# Patient Record
Sex: FEMALE | Race: BLACK OR AFRICAN AMERICAN | Employment: OTHER | URBAN - METROPOLITAN AREA
[De-identification: names, ages, dates, MRNs, and addresses within clinical notes are randomized per-mention and may not be internally consistent; named-entity substitution may affect disease eponyms.]

---

## 2017-04-28 ENCOUNTER — OFFICE VISIT (OUTPATIENT)
Dept: INTERNAL MEDICINE CLINIC | Facility: CLINIC | Age: 67
End: 2017-04-28

## 2017-04-28 VITALS
RESPIRATION RATE: 17 BRPM | TEMPERATURE: 98 F | HEART RATE: 86 BPM | OXYGEN SATURATION: 97 % | DIASTOLIC BLOOD PRESSURE: 62 MMHG | SYSTOLIC BLOOD PRESSURE: 130 MMHG

## 2017-04-28 DIAGNOSIS — G91.2 NPH (NORMAL PRESSURE HYDROCEPHALUS) (HCC): ICD-10-CM

## 2017-04-28 DIAGNOSIS — I48.20 CHRONIC ATRIAL FIBRILLATION (HCC): ICD-10-CM

## 2017-04-28 DIAGNOSIS — Z98.890 S/P AORTIC VALVE REPAIR: ICD-10-CM

## 2017-04-28 DIAGNOSIS — I10 HTN, GOAL BELOW 140/80: ICD-10-CM

## 2017-04-28 DIAGNOSIS — E78.5 HYPERLIPIDEMIA LDL GOAL <100: ICD-10-CM

## 2017-04-28 DIAGNOSIS — Z12.31 VISIT FOR SCREENING MAMMOGRAM: ICD-10-CM

## 2017-04-28 DIAGNOSIS — R53.83 FATIGUE, UNSPECIFIED TYPE: ICD-10-CM

## 2017-04-28 DIAGNOSIS — Z51.81 THERAPEUTIC DRUG MONITORING: ICD-10-CM

## 2017-04-28 DIAGNOSIS — R26.89 NEED FOR ASSISTANCE DUE TO UNSTEADY GAIT: ICD-10-CM

## 2017-04-28 PROCEDURE — 99204 OFFICE O/P NEW MOD 45 MIN: CPT | Performed by: INTERNAL MEDICINE

## 2017-04-28 RX ORDER — SIMVASTATIN 10 MG
20 TABLET ORAL NIGHTLY
COMMUNITY
End: 2018-06-18

## 2017-04-28 RX ORDER — AMLODIPINE BESYLATE 10 MG/1
10 TABLET ORAL DAILY
COMMUNITY
End: 2018-06-18

## 2017-04-28 RX ORDER — FERROUS SULFATE 300 MG/5ML
300 LIQUID (ML) ORAL DAILY
COMMUNITY
End: 2017-06-06

## 2017-04-28 RX ORDER — WARFARIN SODIUM 10 MG/1
TABLET ORAL
COMMUNITY
End: 2017-10-18

## 2017-04-28 RX ORDER — ENALAPRIL MALEATE 10 MG/1
TABLET ORAL
COMMUNITY
End: 2017-04-28 | Stop reason: ALTCHOICE

## 2017-04-28 RX ORDER — HYDROCODONE BITARTRATE AND ACETAMINOPHEN 10; 300 MG/1; MG/1
TABLET ORAL
COMMUNITY
End: 2017-04-28 | Stop reason: ALTCHOICE

## 2017-04-28 RX ORDER — LISINOPRIL 2.5 MG/1
2.5 TABLET ORAL DAILY
COMMUNITY
End: 2018-06-18

## 2017-04-28 NOTE — PROGRESS NOTES
HPI:    Patient ID: Sotero Medeiros is a 74year old female. HPI     Patient is wheelchair bound here with daughter  . Both are poor historians. Patient has h/o aortic valve replacement, chronic Afib, HTN, Hyperlipidemia and anemia.   Her mother was Missing teeth. , poor dentition. Eyes: EOM are normal. Pupils are equal, round, and reactive to light. No scleral icterus. Slightly pale conjunctiva. Neck: Normal range of motion. Neck supple. No tracheal deviation present.  No thyromegaly present encounter       Imaging & Referrals:  PHYSICAL THERAPY - INTERNAL  PEPITO SCREENING BILAT (CPT=77067)       #[id number]

## 2017-04-28 NOTE — PROGRESS NOTES
HPI:   Roz Carranza is a 74year old female who presents for a {Medicare Annual Wellness Description:3401}.     ***  Her last annual assessment has been over 1 year: Annual Physical due on 07/08/1952         Patient Care Team: Patient Care Team:  STEPHANIE denies chest pain on exertion  GI: denies abdominal pain, denies heartburn  : denies dysuria, vaginal discharge or itching, no complaint of urinary incontinence   MUSCULOSKELETAL: denies back pain  NEURO: denies headaches  PSYCHE: denies depression or an General Health                                              Functional Ability                                                  Hearing Problems?: No     Functional Status     Hearing Problems?: No    Vision Problems? : No    Difficulty walking?: Yes flowsheet data found. Bone Density Screening      Dexascan Every two years No results found for this or any previous visit. No flowsheet data found.     Pap and Pelvic      Pap: Every 3 yrs age 21-65 or Pap+HPV every 5 yrs age 33-67, age 72 and older at results found for: CREATSERUM No flowsheet data found. BUN  Annually No results found for: BUN No flowsheet data found. Drug Serum Conc  Annually No results found for: DIGOXIN, DIG, VALP No flowsheet data found.     Diabetes      HgbA1C  Annually No

## 2017-04-28 NOTE — PATIENT INSTRUCTIONS
Rashawn Arias SCREENING SCHEDULE   Tests on this list are recommended by your physician but may not be covered, or covered at this frequency, by your insurer. Please check with your insurance carrier before scheduling to verify coverage.    PREVEN results found for this or any previous visit. No flowsheet data found. Fecal Occult Blood   Covered Annually No results found for: FOB, OCCULTSTOOL No flowsheet data found.      Barium Enema-   uncomfortable but covered  Covered but uncomfortable   Glau this or any previous visit.  Medium/high risk factors:   End-stage renal disease   Hemophiliacs who received Factor VIII or IX concentrates   Clients of institutions for the mentally retarded   Persons who live in the same house as a HepB virus carrier   [facility]

## 2017-05-16 ENCOUNTER — TELEPHONE (OUTPATIENT)
Dept: INTERNAL MEDICINE CLINIC | Facility: CLINIC | Age: 67
End: 2017-05-16

## 2017-05-18 ENCOUNTER — TELEPHONE (OUTPATIENT)
Dept: INTERNAL MEDICINE CLINIC | Facility: CLINIC | Age: 67
End: 2017-05-18

## 2017-05-18 RX ORDER — MULTIVITAMIN
1 TABLET ORAL DAILY
COMMUNITY
Start: 2015-01-23

## 2017-05-18 RX ORDER — AMLODIPINE BESYLATE 10 MG/1
10 TABLET ORAL
COMMUNITY
Start: 2015-01-23 | End: 2017-06-06

## 2017-05-18 RX ORDER — METOPROLOL SUCCINATE 50 MG/1
50 TABLET, EXTENDED RELEASE ORAL DAILY
COMMUNITY
Start: 2015-01-22 | End: 2018-06-18

## 2017-05-18 RX ORDER — WARFARIN SODIUM 3 MG/1
9 TABLET ORAL
COMMUNITY
Start: 2015-01-23 | End: 2017-10-18

## 2017-05-18 RX ORDER — POLYETHYLENE GLYCOL 3350 17 G/17G
17 POWDER, FOR SOLUTION ORAL DAILY
COMMUNITY
Start: 2015-01-22 | End: 2018-06-18

## 2017-05-18 RX ORDER — AMOXICILLIN 250 MG
1 CAPSULE ORAL NIGHTLY
COMMUNITY
Start: 2015-01-22 | End: 2018-06-18

## 2017-05-18 NOTE — TELEPHONE ENCOUNTER
Spoke with patients daughter and completed the forms for [facility] faxed the forms. Instructed the patients daughter to expect a call soon from [facility] regarding the hospital bed and the wheel chair.

## 2017-05-18 NOTE — TELEPHONE ENCOUNTER
Gaby Vera has sent forms to be completed before the wheelchair and hospital bed can be ordered.  Called patients emergency contact and left message for a call back to complete these forms

## 2017-06-06 RX ORDER — MELATONIN
325
COMMUNITY
End: 2017-10-10

## 2017-06-09 ENCOUNTER — TELEPHONE (OUTPATIENT)
Dept: INTERNAL MEDICINE CLINIC | Facility: CLINIC | Age: 67
End: 2017-06-09

## 2017-06-09 NOTE — TELEPHONE ENCOUNTER
refaxed the signed DME to [facility], per [facility] it was not received

## 2017-06-20 ENCOUNTER — TELEPHONE (OUTPATIENT)
Dept: INTERNAL MEDICINE CLINIC | Facility: CLINIC | Age: 67
End: 2017-06-20

## 2017-06-20 NOTE — TELEPHONE ENCOUNTER
Called [employer] we have faxed this information in may expressed dissatisfaction that they claim not to have it. Resent to them 3 times today.  Requested a call to notify they received paperwork

## 2017-06-20 NOTE — TELEPHONE ENCOUNTER
Spoke to patients daughter no wheelchair or hospital bed pattien not out of rehab yet.  Informed refaxed to [facility] awaiting a call from them

## 2017-06-29 ENCOUNTER — TELEPHONE (OUTPATIENT)
Dept: INTERNAL MEDICINE CLINIC | Facility: CLINIC | Age: 67
End: 2017-06-29

## 2017-07-14 ENCOUNTER — TELEPHONE (OUTPATIENT)
Dept: INTERNAL MEDICINE CLINIC | Facility: CLINIC | Age: 67
End: 2017-07-14

## 2017-07-14 NOTE — TELEPHONE ENCOUNTER
He is calling [employer] as that is who the DME is authorized from I have not faxed anything to NATALIYA Barrientos regarding patient

## 2017-08-16 ENCOUNTER — OFFICE VISIT (OUTPATIENT)
Dept: INTERNAL MEDICINE CLINIC | Facility: CLINIC | Age: 67
End: 2017-08-16

## 2017-08-16 VITALS — DIASTOLIC BLOOD PRESSURE: 58 MMHG | SYSTOLIC BLOOD PRESSURE: 100 MMHG

## 2017-08-16 DIAGNOSIS — E46 MALNUTRITION (HCC): ICD-10-CM

## 2017-08-16 DIAGNOSIS — I48.20 CHRONIC ATRIAL FIBRILLATION (HCC): ICD-10-CM

## 2017-08-16 DIAGNOSIS — L89.43: Primary | ICD-10-CM

## 2017-08-16 DIAGNOSIS — I10 ESSENTIAL HYPERTENSION: ICD-10-CM

## 2017-08-16 PROBLEM — I48.91 ATRIAL FIBRILLATION (HCC): Status: ACTIVE | Noted: 2017-08-16

## 2017-08-16 PROBLEM — A53.0 POSITIVE RPR TEST: Status: ACTIVE | Noted: 2017-08-16

## 2017-08-16 PROBLEM — G93.40 ENCEPHALOPATHY: Status: ACTIVE | Noted: 2017-08-16

## 2017-08-16 PROBLEM — R41.0 ACUTE DELIRIUM: Status: ACTIVE | Noted: 2017-08-16

## 2017-08-16 PROBLEM — R29.810 FACIAL DROOP: Status: ACTIVE | Noted: 2017-08-16

## 2017-08-16 PROBLEM — F09 COGNITIVE DISORDER: Status: ACTIVE | Noted: 2017-08-16

## 2017-08-16 PROBLEM — F02.80 ALZHEIMER'S DISEASE (HCC): Status: ACTIVE | Noted: 2017-05-26

## 2017-08-16 PROBLEM — Z86.73 HISTORY OF CEREBROVASCULAR ACCIDENT: Status: ACTIVE | Noted: 2017-08-16

## 2017-08-16 PROBLEM — G30.9 ALZHEIMER'S DEMENTIA WITH BEHAVIORAL DISTURBANCE (HCC): Status: ACTIVE | Noted: 2017-08-16

## 2017-08-16 PROBLEM — F02.81 ALZHEIMER'S DEMENTIA WITH BEHAVIORAL DISTURBANCE (HCC): Status: ACTIVE | Noted: 2017-08-16

## 2017-08-16 PROBLEM — R41.82 ALTERED MENTAL STATUS, UNSPECIFIED: Status: ACTIVE | Noted: 2017-08-16

## 2017-08-16 PROBLEM — G30.9 ALZHEIMER'S DISEASE (HCC): Status: ACTIVE | Noted: 2017-05-26

## 2017-08-16 LAB
INR BLD: 2 (ref 0.9–1.2)
PROTHROMBIN TIME: 22.1 SECONDS (ref 11.8–14.5)

## 2017-08-16 PROCEDURE — 85610 PROTHROMBIN TIME: CPT | Performed by: INTERNAL MEDICINE

## 2017-08-16 PROCEDURE — 99214 OFFICE O/P EST MOD 30 MIN: CPT | Performed by: INTERNAL MEDICINE

## 2017-08-16 RX ORDER — HYDRALAZINE HYDROCHLORIDE 50 MG/1
50 TABLET, FILM COATED ORAL 3 TIMES DAILY
COMMUNITY
End: 2018-06-18

## 2017-08-16 RX ORDER — CYCLOBENZAPRINE HCL 5 MG
5 TABLET ORAL 3 TIMES DAILY PRN
COMMUNITY
End: 2017-10-10

## 2017-08-16 RX ORDER — TRAMADOL HYDROCHLORIDE 50 MG/1
50 TABLET ORAL EVERY 6 HOURS PRN
Qty: 60 TABLET | Refills: 1 | Status: ON HOLD | OUTPATIENT
Start: 2017-08-16 | End: 2017-10-18

## 2017-08-16 NOTE — PROGRESS NOTES
HPI:    Patient ID: Fox Turner is a 69year old female. HPI  Patient is recently discharged from  From [address] to home. She was hospitalized at [facility] last 6/2017 for altered mental status.  H/o HTN, AVR/A fib ( on coumadin), hx of mouth. 100 mgs Qd  Disp:  Rfl:    Multiple Vitamin Oral Tab Take 1 tablet by mouth. Disp:  Rfl:    nicotine 7 MG/24HR Transdermal Patch 24 Hr Place 7 mg onto the skin. Disp:  Rfl:    Polyethylene Glycol 3350 Oral Powder Take 17 g by mouth.  Disp:  Rfl:    S was cleanse with dressing applied. Refer to wound clinic. If still unable to get appt with wound clinic. Will get home care service. Per [facility] home, [facility] . Daughter stated nobody had been to their home.

## 2017-08-23 ENCOUNTER — TELEPHONE (OUTPATIENT)
Dept: INTERNAL MEDICINE CLINIC | Facility: CLINIC | Age: 67
End: 2017-08-23

## 2017-08-23 DIAGNOSIS — Z51.89 ENCOUNTER FOR WOUND CARE: Primary | ICD-10-CM

## 2017-08-23 DIAGNOSIS — L89.90 PRESSURE ULCER, UNSPECIFIED LOCATION, UNSPECIFIED ULCER STAGE: ICD-10-CM

## 2017-08-23 NOTE — TELEPHONE ENCOUNTER
Referrals entered and Home health nurse is faxing the notes from [facility] as patient may have to use a different Home health due to her insurance

## 2017-08-24 ENCOUNTER — TELEPHONE (OUTPATIENT)
Dept: INTERNAL MEDICINE CLINIC | Facility: CLINIC | Age: 67
End: 2017-08-24

## 2017-08-24 NOTE — TELEPHONE ENCOUNTER
They have faxed 81 pages on the patient history at [address] [place] [address].     Patient has not discharged from the hospital shje is still there undergoing additional tests

## 2017-08-28 ENCOUNTER — TELEPHONE (OUTPATIENT)
Dept: INTERNAL MEDICINE CLINIC | Facility: CLINIC | Age: 67
End: 2017-08-28

## 2017-08-28 NOTE — TELEPHONE ENCOUNTER
Called patient to inform her that she is due for a physical  No way to leave a message/ mail box is full

## 2017-09-25 ENCOUNTER — MED REC SCAN ONLY (OUTPATIENT)
Dept: INTERNAL MEDICINE CLINIC | Facility: CLINIC | Age: 67
End: 2017-09-25

## 2017-09-28 ENCOUNTER — TELEPHONE (OUTPATIENT)
Dept: INTERNAL MEDICINE CLINIC | Facility: CLINIC | Age: 67
End: 2017-09-28

## 2017-10-06 ENCOUNTER — TELEPHONE (OUTPATIENT)
Dept: INTERNAL MEDICINE CLINIC | Facility: CLINIC | Age: 67
End: 2017-10-06

## 2017-10-06 NOTE — TELEPHONE ENCOUNTER
Isidro from [facility] called to provide inr results.  INT is  3.9 not taking warfarin was put on hold after hospital discharge

## 2017-10-07 ENCOUNTER — TELEPHONE (OUTPATIENT)
Dept: INTERNAL MEDICINE CLINIC | Facility: CLINIC | Age: 67
End: 2017-10-07

## 2017-10-07 NOTE — TELEPHONE ENCOUNTER
INR 3.6. Warfarin had been held. No sign of bleeding. Advised to resume MVI. Repeat PT/INR 10/10/2017.

## 2017-10-10 ENCOUNTER — APPOINTMENT (OUTPATIENT)
Dept: GENERAL RADIOLOGY | Facility: HOSPITAL | Age: 67
DRG: 570 | End: 2017-10-10
Attending: EMERGENCY MEDICINE
Payer: MEDICARE

## 2017-10-10 ENCOUNTER — HOSPITAL ENCOUNTER (INPATIENT)
Facility: HOSPITAL | Age: 67
LOS: 8 days | Discharge: NURSING, REHAB OR LONG-TERM CARE | DRG: 570 | End: 2017-10-18
Attending: EMERGENCY MEDICINE | Admitting: INTERNAL MEDICINE
Payer: MEDICARE

## 2017-10-10 ENCOUNTER — OFFICE VISIT (OUTPATIENT)
Dept: INTERNAL MEDICINE CLINIC | Facility: CLINIC | Age: 67
End: 2017-10-10

## 2017-10-10 VITALS
DIASTOLIC BLOOD PRESSURE: 60 MMHG | OXYGEN SATURATION: 98 % | RESPIRATION RATE: 19 BRPM | TEMPERATURE: 98 F | HEART RATE: 67 BPM | SYSTOLIC BLOOD PRESSURE: 118 MMHG

## 2017-10-10 DIAGNOSIS — I48.20 CHRONIC ATRIAL FIBRILLATION (HCC): ICD-10-CM

## 2017-10-10 DIAGNOSIS — E86.0 DEHYDRATION: ICD-10-CM

## 2017-10-10 DIAGNOSIS — R52 INTRACTABLE PAIN: ICD-10-CM

## 2017-10-10 DIAGNOSIS — F09 COGNITIVE DISORDER: ICD-10-CM

## 2017-10-10 DIAGNOSIS — E46 PROTEIN-CALORIE MALNUTRITION, UNSPECIFIED SEVERITY (HCC): ICD-10-CM

## 2017-10-10 DIAGNOSIS — L89.150 DECUBITUS ULCER OF SACRAL REGION, UNSTAGEABLE (HCC): Primary | ICD-10-CM

## 2017-10-10 DIAGNOSIS — L89.229: ICD-10-CM

## 2017-10-10 DIAGNOSIS — R52 INTRACTABLE PAIN: Primary | ICD-10-CM

## 2017-10-10 DIAGNOSIS — Z98.890 S/P AORTIC VALVE REPAIR: ICD-10-CM

## 2017-10-10 DIAGNOSIS — N18.9 CHRONIC KIDNEY DISEASE, UNSPECIFIED CKD STAGE: ICD-10-CM

## 2017-10-10 PROBLEM — A41.9 SEVERE SEPSIS (HCC): Status: ACTIVE | Noted: 2017-10-10

## 2017-10-10 PROBLEM — L89.90 PRESSURE ULCER: Status: ACTIVE | Noted: 2017-10-10

## 2017-10-10 PROBLEM — F33.0 MILD RECURRENT MAJOR DEPRESSION (HCC): Chronic | Status: ACTIVE | Noted: 2017-10-10

## 2017-10-10 PROBLEM — D72.829 LEUKOCYTOSIS: Status: ACTIVE | Noted: 2017-10-10

## 2017-10-10 PROBLEM — R65.20 SEVERE SEPSIS (HCC): Status: ACTIVE | Noted: 2017-10-10

## 2017-10-10 PROCEDURE — 99223 1ST HOSP IP/OBS HIGH 75: CPT | Performed by: INTERNAL MEDICINE

## 2017-10-10 PROCEDURE — 72170 X-RAY EXAM OF PELVIS: CPT | Performed by: EMERGENCY MEDICINE

## 2017-10-10 RX ORDER — SACCHAROMYCES BOULARDII 250 MG
250 CAPSULE ORAL 2 TIMES DAILY
Status: DISCONTINUED | OUTPATIENT
Start: 2017-10-10 | End: 2017-10-18

## 2017-10-10 RX ORDER — LISINOPRIL 2.5 MG/1
2.5 TABLET ORAL DAILY
Status: DISCONTINUED | OUTPATIENT
Start: 2017-10-10 | End: 2017-10-18

## 2017-10-10 RX ORDER — HYDRALAZINE HYDROCHLORIDE 50 MG/1
50 TABLET, FILM COATED ORAL 3 TIMES DAILY
Status: DISCONTINUED | OUTPATIENT
Start: 2017-10-10 | End: 2017-10-18

## 2017-10-10 RX ORDER — WARFARIN SODIUM 5 MG/1
5 TABLET ORAL NIGHTLY
Status: DISCONTINUED | OUTPATIENT
Start: 2017-10-10 | End: 2017-10-12

## 2017-10-10 RX ORDER — MORPHINE SULFATE 2 MG/ML
INJECTION, SOLUTION INTRAMUSCULAR; INTRAVENOUS
Status: DISPENSED
Start: 2017-10-10 | End: 2017-10-11

## 2017-10-10 RX ORDER — SENNA AND DOCUSATE SODIUM 50; 8.6 MG/1; MG/1
1 TABLET, FILM COATED ORAL NIGHTLY
Status: DISCONTINUED | OUTPATIENT
Start: 2017-10-10 | End: 2017-10-18

## 2017-10-10 RX ORDER — DEXTROSE AND SODIUM CHLORIDE 5; .9 G/100ML; G/100ML
INJECTION, SOLUTION INTRAVENOUS CONTINUOUS
Status: DISCONTINUED | OUTPATIENT
Start: 2017-10-10 | End: 2017-10-14

## 2017-10-10 RX ORDER — PIPERACILLIN SODIUM, TAZOBACTAM SODIUM 4; .5 G/20ML; G/20ML
4.5 INJECTION, POWDER, LYOPHILIZED, FOR SOLUTION INTRAVENOUS EVERY 12 HOURS
Status: DISCONTINUED | OUTPATIENT
Start: 2017-10-10 | End: 2017-10-10

## 2017-10-10 RX ORDER — AMLODIPINE BESYLATE 10 MG/1
10 TABLET ORAL DAILY
Status: DISCONTINUED | OUTPATIENT
Start: 2017-10-10 | End: 2017-10-18

## 2017-10-10 RX ORDER — QUETIAPINE 25 MG/1
12.5 TABLET, FILM COATED ORAL 2 TIMES DAILY
COMMUNITY
Start: 2017-09-06 | End: 2018-06-18

## 2017-10-10 RX ORDER — BISACODYL 10 MG
10 SUPPOSITORY, RECTAL RECTAL
Status: DISCONTINUED | OUTPATIENT
Start: 2017-10-10 | End: 2017-10-18

## 2017-10-10 RX ORDER — ACETAMINOPHEN 325 MG/1
650 TABLET ORAL EVERY 6 HOURS PRN
Status: DISCONTINUED | OUTPATIENT
Start: 2017-10-10 | End: 2017-10-18

## 2017-10-10 RX ORDER — MORPHINE SULFATE 2 MG/ML
2 INJECTION, SOLUTION INTRAMUSCULAR; INTRAVENOUS EVERY 4 HOURS PRN
Status: DISCONTINUED | OUTPATIENT
Start: 2017-10-10 | End: 2017-10-18

## 2017-10-10 RX ORDER — POLYETHYLENE GLYCOL 3350 17 G/17G
17 POWDER, FOR SOLUTION ORAL DAILY
Status: DISCONTINUED | OUTPATIENT
Start: 2017-10-10 | End: 2017-10-11 | Stop reason: RX

## 2017-10-10 RX ORDER — POLYETHYLENE GLYCOL 3350 17 G/17G
17 POWDER, FOR SOLUTION ORAL DAILY PRN
Status: DISCONTINUED | OUTPATIENT
Start: 2017-10-10 | End: 2017-10-18

## 2017-10-10 RX ORDER — FERROUS SULFATE 325(65) MG
325 TABLET ORAL
Status: ON HOLD | COMMUNITY
End: 2017-10-18

## 2017-10-10 RX ORDER — PIPERACILLIN SODIUM, TAZOBACTAM SODIUM 4; .5 G/20ML; G/20ML
4.5 INJECTION, POWDER, LYOPHILIZED, FOR SOLUTION INTRAVENOUS
COMMUNITY
Start: 2017-09-06 | End: 2017-10-18

## 2017-10-10 RX ORDER — CYCLOBENZAPRINE HCL 5 MG
5 TABLET ORAL 3 TIMES DAILY PRN
COMMUNITY
End: 2018-06-18

## 2017-10-10 RX ORDER — MELATONIN
325 DAILY
Status: DISCONTINUED | OUTPATIENT
Start: 2017-10-10 | End: 2017-10-18

## 2017-10-10 RX ORDER — MORPHINE SULFATE 2 MG/ML
2 INJECTION, SOLUTION INTRAMUSCULAR; INTRAVENOUS ONCE
Status: COMPLETED | OUTPATIENT
Start: 2017-10-10 | End: 2017-10-10

## 2017-10-10 RX ORDER — QUETIAPINE 25 MG/1
12.5 TABLET, FILM COATED ORAL 2 TIMES DAILY
Status: DISCONTINUED | OUTPATIENT
Start: 2017-10-10 | End: 2017-10-18

## 2017-10-10 RX ORDER — DOXEPIN HYDROCHLORIDE 50 MG/1
1 CAPSULE ORAL DAILY
Status: DISCONTINUED | OUTPATIENT
Start: 2017-10-10 | End: 2017-10-18

## 2017-10-10 RX ORDER — SODIUM PHOSPHATE, DIBASIC AND SODIUM PHOSPHATE, MONOBASIC 7; 19 G/133ML; G/133ML
1 ENEMA RECTAL ONCE AS NEEDED
Status: DISCONTINUED | OUTPATIENT
Start: 2017-10-10 | End: 2017-10-18

## 2017-10-10 RX ORDER — PRAVASTATIN SODIUM 10 MG
10 TABLET ORAL NIGHTLY
Status: DISCONTINUED | OUTPATIENT
Start: 2017-10-10 | End: 2017-10-18

## 2017-10-10 NOTE — ED NOTES
Patient here from home with daughter. Was discharged from [facility] recently and sent to [facility] then home 1 week ago. Daughter states she is unable to care for the patients wounds properly and does not feel home health is enough.  Daughter is concern

## 2017-10-10 NOTE — ED PROVIDER NOTES
Patient Seen in: [facility] AND [facility] Emergency Department    History   Patient presents with:  Cellulitis (integumentary, infectious)    Stated Complaint:  pain    HPI    70 yo F with PMH TIA/CVA with general bed-bound debility for past two months, dementia, Tab,  Take 20 mg by mouth nightly. AmLODIPine Besylate 10 MG Oral Tab,  Take 10 mg by mouth daily. lisinopril 2.5 MG Oral Tab,  Take 2.5 mg by mouth daily. Cholecalciferol (VITAMIN D) 1000 units Oral Tab,  Take by mouth.        Family History   Prob LLE in foam boot. Neurological: Alert. BUE/BLE with grossly equal strength, exam limited by patient cooperation/pain. Skin: Skin is warm. Psychiatric: Cooperative. Nursing note and vitals reviewed.         ED Course     Labs Reviewed   BASIC METABOLIC Minimal osteoarthritic changes are seen in both hips. There are some erosive and cystic changes in the right ischial tuberosity. SOFT TISSUES: Negative. No visible soft tissue swelling. EFFUSION: None visible.   OTHER: Large amount of fecal material is se

## 2017-10-10 NOTE — PROGRESS NOTES
HPI:    Patient ID: Bartolo Chau is a 69year old female. HPI     Patient is here for follow up she has chronic Stage IV sacral ulcers. She was admitted to [facility] 9/6 to 9/13/2017 for sepsis and high BP.  Recieved Vancomycin, Zosyn, Tazobactam . S breath sounds normal.   Abdominal: Soft. Bowel sounds are normal. She exhibits no distension. Musculoskeletal: She exhibits no tenderness. Lymphadenopathy:     She has no cervical adenopathy. Neurological: She is alert. Oriented x 2.  Knows her daug

## 2017-10-11 PROCEDURE — 99233 SBSQ HOSP IP/OBS HIGH 50: CPT | Performed by: INTERNAL MEDICINE

## 2017-10-11 RX ORDER — ASCORBIC ACID 500 MG
500 TABLET ORAL DAILY
Status: DISCONTINUED | OUTPATIENT
Start: 2017-10-11 | End: 2017-10-18

## 2017-10-11 RX ORDER — POLYETHYLENE GLYCOL 3350 17 G/17G
17 POWDER, FOR SOLUTION ORAL DAILY
Status: DISCONTINUED | OUTPATIENT
Start: 2017-10-12 | End: 2017-10-18

## 2017-10-11 RX ORDER — ZINC SULFATE 50(220)MG
220 CAPSULE ORAL DAILY
Status: DISCONTINUED | OUTPATIENT
Start: 2017-10-11 | End: 2017-10-18

## 2017-10-11 NOTE — PROGRESS NOTES
[facility]    PATIENT'S NAME: Rolan Willingham    ATTENDING PHYSICIAN: Sandra Diaz MD   PATIENT ACCOUNT#:   [id number]   LOCATION:  [address] [place] [address] RECORD #:   [id number]       YOB: 1925  ADMISSION DATE:       10/10/20 daily; Seroquel 12.5 mg b.i.d.; Senokot 1 tablet b.i.d.; vitamin D 1000 units daily. Warfarin had been held because  INR was 3.6 four days ago. ALLERGIES:  No known drug allergy. SOCIAL HISTORY:  She smokes 1/4 pack of cigarettes daily.   No alcohol sulfate. Dietary consult. 3.   Chronic atrial fibrillation. Warfarin was discontinued a few days ago due to INR of 3.6.   Since the INR is 1.3, we will start warfarin 5 mg tonight and adjust accordingly to keep between 2 to 3.  4.   Chronic kidney diseas

## 2017-10-11 NOTE — PAYOR COMM NOTE
--------------  ADMISSION REVIEW     Payor: [employer] MEDICARE ADV HMO  Subscriber #:  [id number]  Authorization Number: N/A    Admit date: 10/10/17  Admit time: 56       Admitting Physician: Desiree Ingram MD  Attending Physician:  MD Gricel Andrews Take 50 mg by mouth 3 (three) times daily. TraMADol HCl 50 MG Oral Tab,  Take 1 tablet (50 mg total) by mouth every 6 (six) hours as needed for Pain. Metoprolol Succinate ER 50 MG Oral Tablet 24 Hr,  Take 50 mg by mouth.  100 mgs Qd    Multiple Vitamin HPI.    Physical Exam   ED Triage Vitals [10/10/17 1352]  BP: 134/88  Pulse: 56  Resp: 18  Temp: 98.1 °F (36.7 °C)  Temp src: Oral  SpO2: 98 %  O2 Device: None (Room air)    Current:/77   Pulse 79   Temp 98.1 °F (36.7 °C) (Oral)   Resp 20   Ht 167.6 Please view results for these tests on the individual orders. C-REACTIVE PROTEIN   AEROBIC BACTERIAL CULTURE     Xr Pelvis (1 View) (cpt=72170)    Result Date: 10/10/2017  PROCEDURE: XR PELVIS (1 VIEW) (CPT=72170)  COMPARISON: None.   INDICATIONS: S evaluation. [AA.3]    Disposition and Plan     Clinical Impression:  Intractable pain  (primary encounter diagnosis)  Decubitus ulcer of left hip, unspecified ulcer stage    Disposition:[AA.1]  Admit[AA. 4]    Follow-up:  No follow-up provider specified. 10/10/2017 2241 Given 10 mg Oral Kyle Alvarez RN      QUEtiapine Fumarate (SEROQUEL) tab 12.5 mg     Date Action Dose Route User    10/10/2017 2242 Given 12.5 mg Oral Kyle Alvarez RN      saccharomyces boulardii (FLORASTOR) cap 250 mg

## 2017-10-11 NOTE — H&P
[facility]    PATIENT'S NAME: Nakita Butts    ATTENDING PHYSICIAN: Jada Chicas.  MD Emily   PATIENT ACCOUNT#:   [id number]   LOCATION:  [address] [place] [address] RECORD #:   [id number]       YOB: 1925  ADMISSION DATE:       10/10/20 daily; Seroquel 12.5 mg b.i.d.; Senokot 1 tablet b.i.d.; vitamin D 1000 units daily. Warfarin had been held because  INR was 3.6 four days ago. ALLERGIES:  No known drug allergy. SOCIAL HISTORY:  She smokes 1/4 pack of cigarettes daily.   No alcohol sulfate. Dietary consult. 3.   Chronic atrial fibrillation. Warfarin was discontinued a few days ago due to INR of 3.6.   Since the INR is 1.3, we will start warfarin 5 mg tonight and adjust accordingly to keep between 2 to 3.  4.   Chronic kidney diseas

## 2017-10-11 NOTE — PROGRESS NOTES
[facility]    Progress Note    Lee Tabithatierra Patient Status:  Inpatient    1950 MRN [id number]   Location [facility] 5SW/SE Attending Co, Mary Hayes MD   [facility] Day # 1 PCP Scooby Russell MD       SUBJECTIVE:   C/o severe TID   lisinopril (PRINIVIL,ZESTRIL) tab 2.5 mg 2.5 mg Oral Daily   multivitamin (ADULT) tab 1 tablet 1 tablet Oral Daily   nicotine (NICODERM CQ) 7 MG/24HR 1 patch 1 patch Transdermal Daily   Polyethylene Glycol 3350 (GLYCOLAX) powder 17 g 17 g Oral Daily ascorbic acid 500 mg daily and zinc sulfate. Dietary supplement and consult. 3.       Chronic atrial fibrillation. Warfarin was discontinued a few days ago due to INR of 3.6.   Since the INR is 1.3, we will start warfarin 5 mg tonight and adjust accordin

## 2017-10-11 NOTE — CM/SW NOTE
LEONOR left VM w/ dtr to discuss discharge planning. Per ED notes, pt was recently discharged from [facility]. Per notes, dtr states she is unable to care for patients wounds and does not feel HHC is appropriate. LEONOR contacted [facility] for DON screen.     A

## 2017-10-11 NOTE — OCCUPATIONAL THERAPY NOTE
OCCUPATIONAL THERAPY QUICK EVALUATION - INPATIENT    Room Number: 536/536-A  Evaluation Date: 10/11/2017     Type of Evaluation: Initial  Presenting Problem:  (intractable pain, LT hip ulcer, multiple decubitus)    Physician Order: IP Consult to Occupation person does the patient currently need…  -   Putting on and taking off regular lower body clothing?: Total   -   Bathing (including washing, rinsing, drying)?: Total  -   Toileting, which includes using toilet, bedpan or urinal? : Total  -   Putting on and

## 2017-10-11 NOTE — CONSULTS
INFECTIOUS DISEASE CONSULT NOTE    Pedro Lynn Patient Status:  Inpatient    1950 MRN [id number]   Location [facility] 5SW/SE Attending Co, Jimy Montaño MD   Hosp Day # 1 PCP Tamika Howe Medications:   •  Vitamin C (VITAMIN C) tab 500 mg, 500 mg, Oral, Daily  •  zinc sulfate (ZINCATE) cap 220 mg, 220 mg, Oral, Daily  •  AmLODIPine Besylate (NORVASC) tab 10 mg, 10 mg, Oral, Daily  •  Cholecalciferol (VITAMIN D) 1000 units tab 1,000 Units, 1 itching. CARDIOVASCULAR:  No chest pain, chest pressure or chest discomfort  RESPIRATORY:  No shortness of breath, cough or sputum. GASTROINTESTINAL:  No anorexia, nausea, vomiting or diarrhea. No abdominal pain or blood.   GENITOURINARY:  No Burning on u replacement, hyperlipidemia presents to the hospital on 10/10 with intractable pain of the decubitus ulcer with generalized weakness and family unable to take care of her at home.   Patient was a records admitted to [facility] [place] for sepsis from August 16 - Septem

## 2017-10-11 NOTE — DOWNTIME EVENT NOTE
The EMR was down for 2 hours on 10/11/2017. RN was responsible for completing the paper charting during this time period.      The following information was re-entered into the system by Alex Adams:     The following information will remain in

## 2017-10-12 PROCEDURE — 99233 SBSQ HOSP IP/OBS HIGH 50: CPT | Performed by: INTERNAL MEDICINE

## 2017-10-12 RX ORDER — WARFARIN SODIUM 6 MG/1
6 TABLET ORAL NIGHTLY
Status: DISCONTINUED | OUTPATIENT
Start: 2017-10-12 | End: 2017-10-14

## 2017-10-12 RX ORDER — 0.9 % SODIUM CHLORIDE 0.9 %
VIAL (ML) INJECTION
Status: COMPLETED
Start: 2017-10-12 | End: 2017-10-12

## 2017-10-12 NOTE — CM/SW NOTE
SW placed call to [employer] snf who states the pt was there 9/6-10/3. Pt's insurance had exhausted, and she was transitioned to private pay for 4 days.  Pt was arranged with [facility] care services, although the snf is currently [facility]

## 2017-10-12 NOTE — PLAN OF CARE
Problem: Patient/Family Goals  Goal: Patient/Family Long Term Goal  Patient's Long Term Goal: Wound healing    Interventions:  - wound care consult  -santyl as ordered  -surgical consult for possible debridement  -id consult  -continue iv antibiotics  - Se identified and integrated in the patient's plan of care  Interventions:  - What would you like us to know as we care for you? Please keep my daughter informed.  Thanks!  - Provide timely, complete, and accurate information to patient/family  - Incorporate p

## 2017-10-12 NOTE — CONSULTS
[facility]    Report of Consultation    Nonah Pencil Patient Status:  Inpatient    1950 MRN [id number]   Location [facility] 5SW/SE Attending Co, Rachel Felix MD   Hosp Day # 2 PCP Sri Corral MD     Date of Admission (PRINIVIL,ZESTRIL) tab 2.5 mg, 2.5 mg, Oral, Daily  •  multivitamin (ADULT) tab 1 tablet, 1 tablet, Oral, Daily  •  nicotine (NICODERM CQ) 7 MG/24HR 1 patch, 1 patch, Transdermal, Daily  •  QUEtiapine Fumarate (SEROQUEL) tab 12.5 mg, 12.5 mg, Oral, BID  • 10/11/2017   CREATSERUM 1.50 10/10/2017   BUN 21 (H) 10/10/2017    10/10/2017   K 4.6 10/10/2017    10/10/2017   CO2 23 10/10/2017    (H) 10/10/2017   CA 9.3 10/10/2017   INR 1.6 (H) 10/12/2017   CRP 9.5 (H) 10/10/2017   B12 >1,500 (H) 1

## 2017-10-12 NOTE — PROGRESS NOTES
[facility]    Progress Note    Maylin Neighbours Patient Status:  Inpatient    1950 MRN [id number]   Location [facility] 5SW/SE Attending Co, Rik Blizzard, MD   Hosp Day # 2 PCP Albertina Pitts MD       SUBJECTIVE:  Screaming i Latest Ref Range: 8.5 - 10.5 mg/dL 9.3       BUN/CREA RATIO Latest Ref Range: 10.0 - 20.0  14.0       GFR, -American Latest Ref Range: >=60  42 (L)       GFR, Non-African American Latest Ref Range: >=60  34 (L)       ANION GAP Latest Ref Range: 0 - Facility-Administered Medications:  silver sulfADIAZINE (SILVADENE) 1 % cream  Topical BID   Vitamin C (VITAMIN C) tab 500 mg 500 mg Oral Daily   zinc sulfate (ZINCATE) cap 220 mg 220 mg Oral Daily   PEG 3350 (MIRALAX) powder packet 17 g 17 g Oral Daily Leukocytosis     Pressure ulcer     Severe sepsis (HCC)     Mild recurrent major depression (HCC)     Intractable pain     Decubitus ulcer of left hip, unspecified ulcer stage     Moderate protein-calorie malnutrition ([facility] [address]      Plan:    1.       Multiple D

## 2017-10-12 NOTE — PROGRESS NOTES
INFECTIOUS DISEASE PROGRESS NOTE    Segovia Petit Patient Status:  Inpatient    1950 MRN [id number]   Location [facility] 5SW/SE Attending Co, Stephen Stover MD   Hosp Day # 2 PCP Audree Mohs, MD     Subjective:  Afebrile. Tolerating abx. stage     Moderate protein-calorie malnutrition (HCC)     S/P AVR      Antibiotics: Zosyn        ASSESSMENT:  Patient is a 49-year-old female with a history of dementia, COPD, CVA, hypertension, CAD status post aortic valve replacement, hyperlipidemia pres

## 2017-10-12 NOTE — DIETARY NOTE
ADULT NUTRITION INITIAL ASSESSMENT    Pt is at high nutrition risk. Pt meets malnutrition criteria.       RECOMMENDATIONS TO MD:     Recommend Swallow eval and Adjunctive Nutrition Support (EN) given severe Malnutrition, inadequate nutrition intake and mul appropriate. Pt is confused. - Feeding assistance: arrange patient to be fed  - Coordination of nutrition care: collaboration with other providers  - Discharge and transfer of nutrition care to new setting or provider:   Monitor Plans.  Pt is from NH ( Oral Daily   • PEG 3350  17 g Oral Daily   • AmLODIPine Besylate  10 mg Oral Daily   • Cholecalciferol  1,000 Units Oral Daily   • ferrous sulfate  325 mg Oral Daily   • hydrALAzine HCl  50 mg Oral TID   • lisinopril  2.5 mg Oral Daily   • multivitamin  1 is fed and encouraged, pt is likely to eat better. Perhaps food availability is another factor pt is eating better. Spoke with MD, we both agreed to defer Swallow eval and TF at this time.

## 2017-10-12 NOTE — PLAN OF CARE
Problem: Patient/Family Goals  Goal: Patient/Family Long Term Goal  Patient's Long Term Goal: Wound healing    Interventions:  - wound care consult  -santyl as ordered  -surgical consult for possible debridement  -id consult  -continue iv antibiotics  - Se regimen  - Implement oral medicated treatments as ordered  Outcome: Progressing  Offered oral fluids as tolerated.     Problem: Patient Centered Care  Goal: Patient preferences are identified and integrated in the patient's plan of care  Interventions   [facility]

## 2017-10-12 NOTE — PHYSICAL THERAPY NOTE
PHYSICAL THERAPY EVALUATION - INPATIENT     Room Number: 536/536-A  Evaluation Date: 10/12/2017  Type of Evaluation: Initial  Physician Order: PT Eval and Treat    Presenting Problem: Intractable pain, weakness, dementia, immobility  Reason for Therapy Problems:    Decubitus ulcer of left hip, unspecified ulcer stage    Moderate protein-calorie malnutrition ([facility] [address]      Past Medical History  Past Medical History:   Diagnosis Date   • Acute, but ill-defined, cerebrovascular disease     right sided weakness sheets and blankets)?: Unable   -   Sitting down on and standing up from a chair with arms (e.g., wheelchair, bedside commode, etc.): Unable   -   Moving from lying on back to sitting on the side of the bed?: Unable   How much help from another person does

## 2017-10-12 NOTE — WOUND PROGRESS NOTE
WOUND CARE NOTE      PLAN   Recommendations:  May consider surgical evaluation- noted surg consult in the orders  Dr. Rod Seen  currently on consult  Dietary consult for recommendations for nutrition to optimize wound healing  Turn schedules  Specialty b unstageable pressure injury and skin tear, sacrum-unstageble pressure injury, left and right hips with unstageable pressure injury, left and right ankles- unstageable pressure injuries, right lateral and medial foot with dry eschar, unstageable pressure in

## 2017-10-13 ENCOUNTER — APPOINTMENT (OUTPATIENT)
Dept: GENERAL RADIOLOGY | Facility: HOSPITAL | Age: 67
DRG: 570 | End: 2017-10-13
Attending: INTERNAL MEDICINE
Payer: MEDICARE

## 2017-10-13 PROBLEM — E46 MALNUTRITION (HCC): Status: ACTIVE | Noted: 2017-10-13

## 2017-10-13 PROCEDURE — 71010 XR CHEST AP PORTABLE  (CPT=71010): CPT | Performed by: INTERNAL MEDICINE

## 2017-10-13 PROCEDURE — 99233 SBSQ HOSP IP/OBS HIGH 50: CPT | Performed by: INTERNAL MEDICINE

## 2017-10-13 RX ORDER — IPRATROPIUM BROMIDE AND ALBUTEROL SULFATE 2.5; .5 MG/3ML; MG/3ML
3 SOLUTION RESPIRATORY (INHALATION)
Status: DISCONTINUED | OUTPATIENT
Start: 2017-10-13 | End: 2017-10-17

## 2017-10-13 RX ORDER — IPRATROPIUM BROMIDE AND ALBUTEROL SULFATE 2.5; .5 MG/3ML; MG/3ML
SOLUTION RESPIRATORY (INHALATION)
Status: DISPENSED
Start: 2017-10-13 | End: 2017-10-14

## 2017-10-13 RX ORDER — 0.9 % SODIUM CHLORIDE 0.9 %
VIAL (ML) INJECTION
Status: COMPLETED
Start: 2017-10-13 | End: 2017-10-13

## 2017-10-13 RX ORDER — IPRATROPIUM BROMIDE AND ALBUTEROL SULFATE 2.5; .5 MG/3ML; MG/3ML
3 SOLUTION RESPIRATORY (INHALATION)
Status: DISCONTINUED | OUTPATIENT
Start: 2017-10-13 | End: 2017-10-13

## 2017-10-13 NOTE — PROGRESS NOTES
10/12/17 1920   Clinical Encounter Type   Visited With Patient   Routine Visit Introduction   Continue Visiting Yes   Surgical Visit Pre-op   Patient Spiritual Encounters   Spiritual Assessment Completed 1   Spiritual Needs Empathic listening, calming p

## 2017-10-13 NOTE — PLAN OF CARE
Problem: Patient/Family Goals  Goal: Patient/Family Long Term Goal  Patient's Long Term Goal: Wound healing    Interventions:  - wound care consult  -santyl as ordered  -surgical consult for possible debridement  -id consult  -continue iv antibiotics  - Se fall injury  INTERVENTIONS:  - Assess pt frequently for physical needs  - Identify cognitive and physical deficits and behaviors that affect risk of falls.   - Bath fall precautions as indicated by assessment.  - Educate pt/family on patient safety inc

## 2017-10-13 NOTE — PLAN OF CARE
Problem: Patient/Family Goals  Goal: Patient/Family Long Term Goal  Patient's Long Term Goal: Wound healing    Interventions:  - wound care consult  -santyl as ordered  -surgical consult for possible debridement  -id consult  -continue iv antibiotics  - Se Care  Goal: Patient preferences are identified and integrated in the patient's plan of care  Interventions:  - What would you like us to know as we care for you? Please keep my daughter informed.  Thanks!  - Provide timely, complete, and accurate informatio

## 2017-10-13 NOTE — CM/SW NOTE
SW spoke w/ pt's dtr to discuss discharge planning. Dtr confirmed that pt was recently d/c from [facility]. Dtr aware that there is an outstanding balance from [facility].  Dtr stated that pt was supposed to be set up w/ [facility] mat[facility]

## 2017-10-13 NOTE — PROGRESS NOTES
[facility]    Progress Note    Jessica Praveen Patient Status:  Inpatient    1950 MRN [id number]   Location [facility] 5SW/SE Attending Co, Chas Cruz MD   Hosp Day # 3 PCP Belinda Mcintosh MD       SUBJECTIVE:  Productive 1000 units tab 1,000 Units 1,000 Units Oral Daily   ferrous sulfate EC tab 325 mg 325 mg Oral Daily   hydrALAzine HCl (APRESOLINE) tab 50 mg 50 mg Oral TID   lisinopril (PRINIVIL,ZESTRIL) tab 2.5 mg 2.5 mg Oral Daily   multivitamin (ADULT) tab 1 tablet 1 t per wound service ,  Dr. Dwayne Qureshi for debridement( daughter agreeable),   On IV  Zosyn. Pain control. 2.       Severe Malnutrition.  Eating well. Dietary supplement.  Add ascorbic acid 500 mg daily and zinc sulfate.    3.       Mild leucocytosis, produc

## 2017-10-14 RX ORDER — HEPARIN SODIUM AND DEXTROSE 10000; 5 [USP'U]/100ML; G/100ML
10 INJECTION INTRAVENOUS ONCE
Status: DISCONTINUED | OUTPATIENT
Start: 2017-10-14 | End: 2017-10-14

## 2017-10-14 RX ORDER — FUROSEMIDE 20 MG/1
20 TABLET ORAL ONCE
Status: COMPLETED | OUTPATIENT
Start: 2017-10-14 | End: 2017-10-14

## 2017-10-14 RX ORDER — HEPARIN SODIUM AND DEXTROSE 10000; 5 [USP'U]/100ML; G/100ML
INJECTION INTRAVENOUS CONTINUOUS
Status: ACTIVE | OUTPATIENT
Start: 2017-10-15 | End: 2017-10-16

## 2017-10-14 RX ORDER — HEPARIN SODIUM 1000 [USP'U]/ML
60 INJECTION, SOLUTION INTRAVENOUS; SUBCUTANEOUS ONCE
Status: DISCONTINUED | OUTPATIENT
Start: 2017-10-14 | End: 2017-10-14

## 2017-10-14 RX ORDER — HEPARIN SODIUM AND DEXTROSE 10000; 5 [USP'U]/100ML; G/100ML
INJECTION INTRAVENOUS CONTINUOUS
Status: DISCONTINUED | OUTPATIENT
Start: 2017-10-14 | End: 2017-10-14

## 2017-10-14 RX ORDER — 0.9 % SODIUM CHLORIDE 0.9 %
VIAL (ML) INJECTION
Status: COMPLETED
Start: 2017-10-14 | End: 2017-10-14

## 2017-10-14 RX ORDER — HEPARIN SODIUM AND DEXTROSE 10000; 5 [USP'U]/100ML; G/100ML
12 INJECTION INTRAVENOUS ONCE
Status: COMPLETED | OUTPATIENT
Start: 2017-10-14 | End: 2017-10-14

## 2017-10-14 RX ORDER — IPRATROPIUM BROMIDE AND ALBUTEROL SULFATE 2.5; .5 MG/3ML; MG/3ML
3 SOLUTION RESPIRATORY (INHALATION) EVERY 4 HOURS PRN
Status: DISCONTINUED | OUTPATIENT
Start: 2017-10-14 | End: 2017-10-18

## 2017-10-14 RX ORDER — HEPARIN SODIUM AND DEXTROSE 10000; 5 [USP'U]/100ML; G/100ML
12 INJECTION INTRAVENOUS ONCE
Status: DISCONTINUED | OUTPATIENT
Start: 2017-10-14 | End: 2017-10-14

## 2017-10-14 RX ORDER — PREDNISONE 20 MG/1
20 TABLET ORAL
Status: DISPENSED | OUTPATIENT
Start: 2017-10-14 | End: 2017-10-17

## 2017-10-14 NOTE — PLAN OF CARE
Problem: Patient/Family Goals  Goal: Patient/Family Long Term Goal  Patient's Long Term Goal: Wound healing    Interventions:  - wound care consult  -santyl as ordered  -surgical consult for possible debridement  -id consult  -continue iv antibiotics  - Se oral medicated treatments as ordered   Outcome: Progressing  Oral care provided during shift.      Problem: SAFETY ADULT - FALL  Goal: Free from fall injury  INTERVENTIONS:  - Assess pt frequently for physical needs  - Identify cognitive and physical defici

## 2017-10-14 NOTE — PROGRESS NOTES
[facility]    Progress Note    Trangniki Galloway Patient Status:  Inpatient    1950 MRN [id number]   Location [facility] 5SW/SE Attending Co, Evi Rothman MD   Hosp Day # 4 PCP Tatiana Salinas MD     Subjective:   Subjective: Topical care per wound service. Pain control. Holding coumadin  2.       Severe Malnutrition.  Eating well. Dietary supplement.  Add ascorbic acid 500 mg daily and zinc sulfate. 3.       Mild leucocytosis, productive cough . Treat pneumonia.   X-ray que

## 2017-10-14 NOTE — PLAN OF CARE
Problem: Patient/Family Goals  Goal: Patient/Family Long Term Goal  Patient's Long Term Goal: Wound healing    Interventions:  - wound care consult  -santyl as ordered  -surgical consult for possible debridement  -id consult  -continue iv antibiotics  - Se to call for assistance with activity based on assessment  - Modify environment to reduce risk of injury  - Provide assistive devices as appropriate  - Consider OT/PT consult to assist with strengthening/mobility  - Encourage toileting schedule   Outcome: P

## 2017-10-14 NOTE — PROGRESS NOTES
[facility]    Progress Note    Lee Yin Patient Status:  Inpatient    1950 MRN [id number]   Location [facility] 5SW/SE Attending Co, Mary Hayes MD   Hosp Day # 3 PCP Scooby Russell MD            Subjective:     Pt no not excluded. 3. Small right basilar pleural effusion and/or pleural thickening. 4. Post aortic valve replacement.                  Assessment and Plan:            Decubitus ulcer sacrum    Decutitus uclers left foot    Decubitus ulcers right foot    Decubi

## 2017-10-15 ENCOUNTER — APPOINTMENT (OUTPATIENT)
Dept: GENERAL RADIOLOGY | Facility: HOSPITAL | Age: 67
DRG: 570 | End: 2017-10-15
Attending: FAMILY MEDICINE
Payer: MEDICARE

## 2017-10-15 PROCEDURE — 71010 XR CHEST AP/PA (1 VIEW) (CPT=71010): CPT | Performed by: FAMILY MEDICINE

## 2017-10-15 NOTE — PROGRESS NOTES
[place] [facility]    Progress Note    Vinod Rockwell Patient Status:  Inpatient    1950 MRN [id number]   Location [facility] 5SW/SE Attending Co, Jamia Lutz MD   Hosp Day # 5 PCP Asim Avalos MD            Subjective:     Pt no Xr Chest Ap Portable  (cpt=71010)    Result Date: 10/13/2017  CONCLUSION:  1. Limited evaluation due to to semiupright technique, suboptimal inspiration and patient rotation. 2. Cardiomegaly and probably mild CHF.  A coexistent left perihilar pneumonia

## 2017-10-15 NOTE — PROGRESS NOTES
[facility] dosing service    Initial Pharmacokinetic Consult for Vancomycin Dosing     Bala Odonnell is a 69year old female who is being treated for cellulitis.   Pharmacy has been asked to dose Vancomycin by Dr. Mike Damian    She has No Known Allergies Aerobic Smear No WBCs seen N/A   Aerobic Smear 2+ Gram Negative Rods N/A   *Culture results found, see result in Chart Review         Based on the above:    1.  This patient will receive a loading dose of Vancomycin  1.5 gm IVPB (25mg/kg, capped at 2g) x

## 2017-10-16 ENCOUNTER — ANESTHESIA (OUTPATIENT)
Dept: SURGERY | Facility: HOSPITAL | Age: 67
DRG: 570 | End: 2017-10-16
Payer: MEDICARE

## 2017-10-16 ENCOUNTER — ANESTHESIA EVENT (OUTPATIENT)
Dept: SURGERY | Facility: HOSPITAL | Age: 67
DRG: 570 | End: 2017-10-16
Payer: MEDICARE

## 2017-10-16 ENCOUNTER — SURGERY (OUTPATIENT)
Age: 67
End: 2017-10-16

## 2017-10-16 PROCEDURE — 0HBNXZZ EXCISION OF LEFT FOOT SKIN, EXTERNAL APPROACH: ICD-10-PCS | Performed by: SURGERY

## 2017-10-16 PROCEDURE — 0JBM0ZZ EXCISION OF LEFT UPPER LEG SUBCUTANEOUS TISSUE AND FASCIA, OPEN APPROACH: ICD-10-PCS | Performed by: SURGERY

## 2017-10-16 PROCEDURE — 0JB70ZZ EXCISION OF BACK SUBCUTANEOUS TISSUE AND FASCIA, OPEN APPROACH: ICD-10-PCS | Performed by: SURGERY

## 2017-10-16 PROCEDURE — 0HBMXZZ EXCISION OF RIGHT FOOT SKIN, EXTERNAL APPROACH: ICD-10-PCS | Performed by: SURGERY

## 2017-10-16 PROCEDURE — 0HBHXZZ EXCISION OF RIGHT UPPER LEG SKIN, EXTERNAL APPROACH: ICD-10-PCS | Performed by: SURGERY

## 2017-10-16 PROCEDURE — 99233 SBSQ HOSP IP/OBS HIGH 50: CPT | Performed by: INTERNAL MEDICINE

## 2017-10-16 RX ORDER — MORPHINE SULFATE 4 MG/ML
4 INJECTION, SOLUTION INTRAMUSCULAR; INTRAVENOUS EVERY 10 MIN PRN
Status: DISCONTINUED | OUTPATIENT
Start: 2017-10-16 | End: 2017-10-16 | Stop reason: HOSPADM

## 2017-10-16 RX ORDER — HYDROCODONE BITARTRATE AND ACETAMINOPHEN 5; 325 MG/1; MG/1
1 TABLET ORAL AS NEEDED
Status: DISCONTINUED | OUTPATIENT
Start: 2017-10-16 | End: 2017-10-16 | Stop reason: HOSPADM

## 2017-10-16 RX ORDER — METOPROLOL TARTRATE 5 MG/5ML
2.5 INJECTION INTRAVENOUS ONCE
Status: DISCONTINUED | OUTPATIENT
Start: 2017-10-16 | End: 2017-10-16 | Stop reason: HOSPADM

## 2017-10-16 RX ORDER — HYDROMORPHONE HYDROCHLORIDE 1 MG/ML
0.2 INJECTION, SOLUTION INTRAMUSCULAR; INTRAVENOUS; SUBCUTANEOUS EVERY 5 MIN PRN
Status: DISCONTINUED | OUTPATIENT
Start: 2017-10-16 | End: 2017-10-16 | Stop reason: HOSPADM

## 2017-10-16 RX ORDER — HYDROCODONE BITARTRATE AND ACETAMINOPHEN 5; 325 MG/1; MG/1
2 TABLET ORAL AS NEEDED
Status: DISCONTINUED | OUTPATIENT
Start: 2017-10-16 | End: 2017-10-16 | Stop reason: HOSPADM

## 2017-10-16 RX ORDER — WARFARIN SODIUM 6 MG/1
6 TABLET ORAL NIGHTLY
Status: DISCONTINUED | OUTPATIENT
Start: 2017-10-16 | End: 2017-10-18

## 2017-10-16 RX ORDER — HYDROMORPHONE HYDROCHLORIDE 1 MG/ML
0.6 INJECTION, SOLUTION INTRAMUSCULAR; INTRAVENOUS; SUBCUTANEOUS EVERY 5 MIN PRN
Status: DISCONTINUED | OUTPATIENT
Start: 2017-10-16 | End: 2017-10-16 | Stop reason: HOSPADM

## 2017-10-16 RX ORDER — ONDANSETRON 2 MG/ML
4 INJECTION INTRAMUSCULAR; INTRAVENOUS ONCE AS NEEDED
Status: DISCONTINUED | OUTPATIENT
Start: 2017-10-16 | End: 2017-10-16 | Stop reason: HOSPADM

## 2017-10-16 RX ORDER — NALOXONE HYDROCHLORIDE 0.4 MG/ML
80 INJECTION, SOLUTION INTRAMUSCULAR; INTRAVENOUS; SUBCUTANEOUS AS NEEDED
Status: DISCONTINUED | OUTPATIENT
Start: 2017-10-16 | End: 2017-10-16 | Stop reason: HOSPADM

## 2017-10-16 RX ORDER — SODIUM CHLORIDE, SODIUM LACTATE, POTASSIUM CHLORIDE, CALCIUM CHLORIDE 600; 310; 30; 20 MG/100ML; MG/100ML; MG/100ML; MG/100ML
INJECTION, SOLUTION INTRAVENOUS CONTINUOUS PRN
Status: DISCONTINUED | OUTPATIENT
Start: 2017-10-16 | End: 2017-10-16 | Stop reason: SURG

## 2017-10-16 RX ORDER — SODIUM CHLORIDE 9 MG/ML
INJECTION, SOLUTION INTRAVENOUS
Status: COMPLETED
Start: 2017-10-16 | End: 2017-10-16

## 2017-10-16 RX ORDER — MORPHINE SULFATE 10 MG/ML
6 INJECTION, SOLUTION INTRAMUSCULAR; INTRAVENOUS EVERY 10 MIN PRN
Status: DISCONTINUED | OUTPATIENT
Start: 2017-10-16 | End: 2017-10-16 | Stop reason: HOSPADM

## 2017-10-16 RX ORDER — MORPHINE SULFATE 2 MG/ML
2 INJECTION, SOLUTION INTRAMUSCULAR; INTRAVENOUS EVERY 10 MIN PRN
Status: DISCONTINUED | OUTPATIENT
Start: 2017-10-16 | End: 2017-10-16 | Stop reason: HOSPADM

## 2017-10-16 RX ORDER — HYDROMORPHONE HYDROCHLORIDE 1 MG/ML
0.4 INJECTION, SOLUTION INTRAMUSCULAR; INTRAVENOUS; SUBCUTANEOUS EVERY 5 MIN PRN
Status: DISCONTINUED | OUTPATIENT
Start: 2017-10-16 | End: 2017-10-16 | Stop reason: HOSPADM

## 2017-10-16 RX ORDER — SODIUM CHLORIDE, SODIUM LACTATE, POTASSIUM CHLORIDE, CALCIUM CHLORIDE 600; 310; 30; 20 MG/100ML; MG/100ML; MG/100ML; MG/100ML
INJECTION, SOLUTION INTRAVENOUS CONTINUOUS
Status: DISCONTINUED | OUTPATIENT
Start: 2017-10-16 | End: 2017-10-18

## 2017-10-16 RX ORDER — LIDOCAINE HYDROCHLORIDE 10 MG/ML
INJECTION, SOLUTION EPIDURAL; INFILTRATION; INTRACAUDAL; PERINEURAL AS NEEDED
Status: DISCONTINUED | OUTPATIENT
Start: 2017-10-16 | End: 2017-10-16 | Stop reason: SURG

## 2017-10-16 RX ORDER — METRONIDAZOLE 500 MG/100ML
500 INJECTION, SOLUTION INTRAVENOUS EVERY 8 HOURS
Status: DISCONTINUED | OUTPATIENT
Start: 2017-10-16 | End: 2017-10-18

## 2017-10-16 RX ORDER — HALOPERIDOL 5 MG/ML
0.25 INJECTION INTRAMUSCULAR ONCE AS NEEDED
Status: DISCONTINUED | OUTPATIENT
Start: 2017-10-16 | End: 2017-10-16 | Stop reason: HOSPADM

## 2017-10-16 RX ADMIN — LIDOCAINE HYDROCHLORIDE 25 MG: 10 INJECTION, SOLUTION EPIDURAL; INFILTRATION; INTRACAUDAL; PERINEURAL at 17:16:00

## 2017-10-16 RX ADMIN — SODIUM CHLORIDE, SODIUM LACTATE, POTASSIUM CHLORIDE, CALCIUM CHLORIDE: 600; 310; 30; 20 INJECTION, SOLUTION INTRAVENOUS at 18:30:00

## 2017-10-16 RX ADMIN — SODIUM CHLORIDE, SODIUM LACTATE, POTASSIUM CHLORIDE, CALCIUM CHLORIDE: 600; 310; 30; 20 INJECTION, SOLUTION INTRAVENOUS at 17:03:00

## 2017-10-16 NOTE — PROGRESS NOTES
[place] [facility]    Progress Note    Jose Angel Yost Patient Status:  Inpatient    1950 MRN [id number]   Location [facility] 5SW/SE Attending Co, Maria Elena Humphreys MD   Hosp Day # 5 PCP Carrie Markham MD     Subjective:   Subjective: Topical care per wound service. Pain control. Holding coumadin  2.       Severe Malnutrition.  Eating well. Dietary supplement.  Add ascorbic acid 500 mg daily and zinc sulfate. 3.       Mild leucocytosis, productive cough . Treat pneumonia.   X-ray que

## 2017-10-16 NOTE — CM/SW NOTE
3:09pm Update- LEONOR received a call from Daja sarmiento/Sarah [phone] confirming they received the referral and will reach out to  once they have reviewed the case.     --  2:26pm Update- LEONOR received a call from Geovanny [phone]

## 2017-10-16 NOTE — ANESTHESIA PREPROCEDURE EVALUATION
Anesthesia PreOp Note    HPI:     Analisa Amaya is a 69year old female who presents for preoperative consultation requested by: Humberto Ojeda MD    Date of Surgery: 10/16/2017    Procedure(s):   I AND D ISCHIAL OR SACRAL DEBRIDEMENT  Indication: Dec High cholesterol    • Hyperlipidemia    • Incontinence    • Osteoarthritis        Past Surgical History:  No date: VALVE REPAIR      Prescriptions Prior to Admission:  QUEtiapine Fumarate 25 MG Oral Tab Take 12.5 mg by mouth.  Disp:  Rfl:  10/9/2017 at [facility] daily. Disp:  Rfl:  10/9/2017 at Unknown time   Cholecalciferol (VITAMIN D) 1000 units Oral Tab Take by mouth. Disp:  Rfl:  10/9/2017 at Unknown time   Piperacillin Sod-Tazobactam So 4.5 (4-0.5) g Intravenous Recon Soln Inject 4.5 g into the vein.  Disp:  R Merry Vogel  mg at 10/15/17 0907    [facility] Hold] hydrALAzine HCl (APRESOLINE) tab 50 mg 50 mg Oral TID Meghan MCCLURE MD 50 mg at 10/15/17 2229    lisinopril (PRINIVIL,ZESTRIL) tab 2.5 mg 2.5 mg Oral Daily Co, Renay MCCLURE MD 2.5 mg at 10/16/17 1101    mu Smoking status: Former Smoker     Quit date: 10/28/2016    Smokeless tobacco: Never Used    Comment: quit     Alcohol use No    Drug use: No    Sexual activity: Not Currently     Other Topics Concern   None on file     Social History Narrative   None on fi Anesthesia Plan:   ASA:  3  Plan:   General  Airway:  ETT  Post-op Pain Management: IV analgesics and Local  Plan Comments: Hb 7.7 last ann  Repeat H/H, T& S, if Hb below 8, T & C  Discussed plan with:  Surgeon      I have informed Mark Bashir

## 2017-10-16 NOTE — PROGRESS NOTES
[facility]    Progress Note    Socrates Galloway Patient Status:  Inpatient    1950 MRN [id number]   Location [facility] 5SW/SE Attending Co, Evi Rothman MD   Hosp Day # 6 PCP Tatiana Salinas MD       SUBJECTIVE:  Feels well. Levofloxacin 2  Sensitive <=0.12 \"><=0.12  Sensitive    Meropenem <=1 \"><=1  Sensitive      Piperacillin + Tazobactam 8  Sensitive <=4 \"><=4  Sensitive    Trimethoprim/Sulfa   <=20 \"><=20  Sensitive                     Imaging:  PROCEDURE:  XR CHEST AP AmLODIPine Besylate (NORVASC) tab 10 mg 10 mg Oral Daily   Cholecalciferol (VITAMIN D) 1000 units tab 1,000 Units 1,000 Units Oral Daily   ferrous sulfate EC tab 325 mg 325 mg Oral Daily   hydrALAzine HCl (APRESOLINE) tab 50 mg 50 mg Oral TID   lisinopril 1.       Multiple Decubitus ulcers, large unstageable sacral area,  , bilateral hips and feet  ulcers. On IV zosyn. , Vancomycin. Wound + Pseudomonas, proteous mirabilis. Debridement today. Warfarin on hold. 2.       Severe Malnutrition.  Eating well.

## 2017-10-17 PROCEDURE — 02HV33Z INSERTION OF INFUSION DEVICE INTO SUPERIOR VENA CAVA, PERCUTANEOUS APPROACH: ICD-10-PCS | Performed by: INTERNAL MEDICINE

## 2017-10-17 PROCEDURE — 99232 SBSQ HOSP IP/OBS MODERATE 35: CPT | Performed by: INTERNAL MEDICINE

## 2017-10-17 PROCEDURE — 30233N1 TRANSFUSION OF NONAUTOLOGOUS RED BLOOD CELLS INTO PERIPHERAL VEIN, PERCUTANEOUS APPROACH: ICD-10-PCS | Performed by: INTERNAL MEDICINE

## 2017-10-17 RX ORDER — 0.9 % SODIUM CHLORIDE 0.9 %
VIAL (ML) INJECTION
Status: COMPLETED
Start: 2017-10-17 | End: 2017-10-17

## 2017-10-17 RX ORDER — SODIUM CHLORIDE 0.9 % (FLUSH) 0.9 %
10 SYRINGE (ML) INJECTION AS NEEDED
Status: DISCONTINUED | OUTPATIENT
Start: 2017-10-17 | End: 2017-10-18

## 2017-10-17 RX ORDER — IPRATROPIUM BROMIDE AND ALBUTEROL SULFATE 2.5; .5 MG/3ML; MG/3ML
3 SOLUTION RESPIRATORY (INHALATION)
Status: DISCONTINUED | OUTPATIENT
Start: 2017-10-17 | End: 2017-10-18

## 2017-10-17 RX ORDER — SODIUM CHLORIDE 9 MG/ML
INJECTION, SOLUTION INTRAVENOUS
Status: DISPENSED
Start: 2017-10-17 | End: 2017-10-17

## 2017-10-17 RX ORDER — SODIUM CHLORIDE 9 MG/ML
INJECTION, SOLUTION INTRAVENOUS ONCE
Status: COMPLETED | OUTPATIENT
Start: 2017-10-17 | End: 2017-10-17

## 2017-10-17 NOTE — BRIEF OP NOTE
Pre-Operative Diagnosis: Decubitus ulcers of left hip, right hip, bilateral feet, sacral  unspecified ulcer stage [L89.229]     Post-Operative Diagnosis: same     Procedure Performed:   Procedure(s):  DEBRIDEMENT OF BILATERAL FEET DECUBITI, BILATERAL HI

## 2017-10-17 NOTE — PROGRESS NOTES
[facility]    Progress Note    Carl Wagner Patient Status:  Inpatient    1950 MRN [id number]   Location [facility] 5SW/SE Attending Co, Veronica Posadas MD   Hosp Day # 7 PCP Eleni Heck MD       SUBJECTIVE:  Denies pain Chloride 0.9 % 10 mL IV push 40 mg Intravenous Q24H   Cefepime HCl (MAXIPIME) 1 g in sodium chloride 0.9 % 100 mL IVPB 1 g Intravenous Q12H Peds   metRONIDAZOLE in NaCl (FLAGYL) 5 mg/ml IVPB premix 500 mg 500 mg Intravenous Q8H   lactated ringers infusion unspecified     Alzheimer's dementia with behavioral disturbance     Alzheimer's disease     Chronic atrial fibrillation (HCC)     Cognitive disorder     Encephalopathy     Essential hypertension     Facial droop     History of cerebrovascular accident

## 2017-10-17 NOTE — DIETARY NOTE
ADULT NUTRITION RE-ASSESSMENT    Pt is at high nutrition risk. Pt meets malnutrition criteria.       RECOMMENDATIONS TO MD:     Recommend Swallow eval and Adjunctive Nutrition Support (EN) given severe Malnutrition, inadequate nutrition intake and multiple Enlive to max po ( 350 kcal w/ 20 g protein per 8 oz bottle )  - Vitamin and mineral supplements: multivitamin/mineral, iron, zinc and Vit D  - Nutrition education: not appropriate. Pt is confused.    - Feeding assistance: arrange patient to be fed  - Coord MEDICATIONS: reviewed    LABS: reviewed     NUTRITION RELATED PHYSICAL FINDINGS:  - Body Fat/Muscle Mass: severe depletion body fat orbital region, triceps region and fat overlying rib cage region and severe depletion muscle mass temple region, clavicl

## 2017-10-17 NOTE — PROGRESS NOTES
INFECTIOUS DISEASE PROGRESS NOTE    Apurva Randolph Patient Status:  Inpatient    1950 MRN [id number]   Location [facility] 5SW/SE Attending Co, Ger Weston MD   Hosp Day # 7 PCP Mere Oconnell MD     Subjective:  Afebrile. Tolerating abx. replacement, hyperlipidemia presents to the hospital on 10/10 with intractable pain of the decubitus ulcer with generalized weakness and family unable to take care of her at home.   Patient was a records admitted to [facility] for sepsis from August 16 - Septem

## 2017-10-17 NOTE — PLAN OF CARE
Problem: Patient/Family Goals  Goal: Patient/Family Long Term Goal  Patient's Long Term Goal: Wound healing    Interventions:  - wound care consult  -santyl as ordered  -surgical consult for possible debridement  -id consult  -continue iv antibiotics  - Se plan of care  Interventions:  - What would you like us to know as we care for you? Please keep my daughter informed.  Thanks!  - Provide timely, complete, and accurate information to patient/family  - Incorporate patient and family knowledge, values, belief

## 2017-10-17 NOTE — WOUND PROGRESS NOTE
Wound care Services  PLAN   Recommendations:  Dr Sugar Torres and Dr. Mary Lopez are  following the pt.   Dietary consult for recommendations for nutrition to optimize wound healing- following  Turn schedules  Specialty bed: [facility] c200 bed in use  Heels elevated feeding the pt. her lunch.

## 2017-10-17 NOTE — CM/SW NOTE
LEONOR sent updated clinicals to [facility]. Waldemar Saavedra has started insurance authorization. Waldemar Saavedra liaison to do on-site w/ pt tomorrow morning.     René Doyle, [address]

## 2017-10-17 NOTE — PAYOR COMM NOTE
--------------  ADMISSION REVIEW     Payor: [employer] MEDICARE ADV HMO  Subscriber #:  [id number]  Authorization Number: [id number]    Admit date: 10/10/17  Admit time: 56       Admitting Physician: Eliseo Randall MD  Attending Physician:  Eliseo Randall MD  Pr Take 50 mg by mouth 3 (three) times daily. TraMADol HCl 50 MG Oral Tab,  Take 1 tablet (50 mg total) by mouth every 6 (six) hours as needed for Pain. Metoprolol Succinate ER 50 MG Oral Tablet 24 Hr,  Take 50 mg by mouth.  100 mgs Qd    Multiple Vitamin HPI.    Physical Exam   ED Triage Vitals [10/10/17 1352]  BP: 134/88  Pulse: 56  Resp: 18  Temp: 98.1 °F (36.7 °C)  Temp src: Oral  SpO2: 98 %  O2 Device: None (Room air)    Current:/77   Pulse 79   Temp 98.1 °F (36.7 °C) (Oral)   Resp 20   Ht 167.6 Please view results for these tests on the individual orders. C-REACTIVE PROTEIN   AEROBIC BACTERIAL CULTURE     Xr Pelvis (1 View) (cpt=72170)    Result Date: 10/10/2017  PROCEDURE: XR PELVIS (1 VIEW) (CPT=72170)  COMPARISON: None.   INDICATIONS: S evaluation. [AA.3]    Disposition and Plan     Clinical Impression:  Intractable pain  (primary encounter diagnosis)  Decubitus ulcer of left hip, unspecified ulcer stage    Disposition:[AA.1]  Admit[AA. 4]    Follow-up:  No follow-up provider specified. working, is unable to take care of her at home. She stated that the decubitus ulcer had gotten worse, and the patient experienced severe pain not relieved with oral analgesic like tramadol. The patient had been screaming due to pain. Appetite is poor. sounds. EXTREMITIES:  No edema. SKIN:  Multiple pressure ulcers. The biggest one was in the sacrum, which is unstageable. There is no sign of discharge. There is mild sloughing. Other ulcers noted on the heel.   NEUROLOGIC/PSYCHIATRIC:  The patient do mg     Date Action Dose Route User    10/17/2017 0919 Given 10 mg Oral Negrito Mendoza RN      Cefepime HCl (MAXIPIME) 1 g in sodium chloride 0.9 % 100 mL IVPB     Date Action Dose Route User    10/17/2017 0515 New Bag 1 g Intravenous Kong Gutiérrez RN (FLAGYL) 5 mg/ml IVPB premix 500 mg     Date Action Dose Route User    10/17/2017 0639 New Bag 500 mg Intravenous Tony Gordillo RN    10/16/2017 2244 New Bag 500 mg Intravenous Tony Gordillo RN      microfibrillar collagen (AVITENE) powder     Date S) 8.6-50 MG tab 1 tablet     Date Action Dose Route User    10/16/2017 2116 Given 1 tablet Oral Corona Melgar, RN      sodium chloride 0.9 % infusion     Date Action Dose Route User    10/16/2017 1138 New Bag 1000 mL (none) Saran Yan, [facility]

## 2017-10-17 NOTE — PROGRESS NOTES
[facility]    Progress Note    Mark Bashir Patient Status:  Inpatient    1950 MRN [id number]   Location [facility] 5SW/SE Attending Co, Shant Beasley MD   Hosp Day # 7 PCP Deb Ya MD            Subjective:      When sacrum  Decutitus uclers left foot  Decubitus ulcers right foot  Decubitus ulcer of right hip, unspecified ulcer stage  Decubitus ulcer of left hip, unspecified ulcer stage  Moderate protein-calorie malnutrition ([facility] [address]  S/p debridement  Wound care following

## 2017-10-18 ENCOUNTER — TELEPHONE (OUTPATIENT)
Dept: INTERNAL MEDICINE CLINIC | Facility: CLINIC | Age: 67
End: 2017-10-18

## 2017-10-18 VITALS
DIASTOLIC BLOOD PRESSURE: 50 MMHG | RESPIRATION RATE: 18 BRPM | OXYGEN SATURATION: 98 % | TEMPERATURE: 98 F | SYSTOLIC BLOOD PRESSURE: 119 MMHG | HEIGHT: 66 IN | HEART RATE: 83 BPM | BODY MASS INDEX: 19.37 KG/M2 | WEIGHT: 120.5 LBS

## 2017-10-18 PROCEDURE — 99239 HOSP IP/OBS DSCHRG MGMT >30: CPT | Performed by: INTERNAL MEDICINE

## 2017-10-18 RX ORDER — LIDOCAINE HYDROCHLORIDE 10 MG/ML
0.5 INJECTION, SOLUTION INFILTRATION; PERINEURAL ONCE AS NEEDED
Status: DISCONTINUED | OUTPATIENT
Start: 2017-10-18 | End: 2017-10-18

## 2017-10-18 RX ORDER — METRONIDAZOLE 500 MG/100ML
500 INJECTION, SOLUTION INTRAVENOUS EVERY 8 HOURS
Qty: 42 CONTAINER | Refills: 0 | Status: SHIPPED | OUTPATIENT
Start: 2017-10-18 | End: 2017-11-01

## 2017-10-18 RX ORDER — FERROUS SULFATE 325(65) MG
TABLET ORAL
Qty: 60 TABLET | Refills: 0 | Status: SHIPPED | OUTPATIENT
Start: 2017-10-18 | End: 2018-06-18

## 2017-10-18 RX ORDER — ZINC SULFATE 50(220)MG
220 CAPSULE ORAL DAILY
Qty: 90 CAPSULE | Refills: 0 | Status: SHIPPED | OUTPATIENT
Start: 2017-10-19 | End: 2018-06-18

## 2017-10-18 RX ORDER — WARFARIN SODIUM 6 MG/1
6 TABLET ORAL NIGHTLY
Qty: 30 TABLET | Refills: 0 | Status: ON HOLD | OUTPATIENT
Start: 2017-10-18 | End: 2018-07-22

## 2017-10-18 RX ORDER — 0.9 % SODIUM CHLORIDE 0.9 %
VIAL (ML) INJECTION
Status: COMPLETED
Start: 2017-10-18 | End: 2017-10-18

## 2017-10-18 RX ORDER — SODIUM CHLORIDE 0.9 % (FLUSH) 0.9 %
10 SYRINGE (ML) INJECTION AS NEEDED
Status: DISCONTINUED | OUTPATIENT
Start: 2017-10-18 | End: 2017-10-18

## 2017-10-18 RX ORDER — SACCHAROMYCES BOULARDII 250 MG
250 CAPSULE ORAL 2 TIMES DAILY
Qty: 60 CAPSULE | Refills: 0 | Status: SHIPPED | OUTPATIENT
Start: 2017-10-18 | End: 2018-06-18

## 2017-10-18 RX ORDER — IPRATROPIUM BROMIDE AND ALBUTEROL SULFATE 2.5; .5 MG/3ML; MG/3ML
3 SOLUTION RESPIRATORY (INHALATION) EVERY 4 HOURS PRN
Qty: 30 VIAL | Refills: 0 | Status: SHIPPED | OUTPATIENT
Start: 2017-10-18 | End: 2018-06-18

## 2017-10-18 RX ORDER — ASCORBIC ACID 500 MG
500 TABLET ORAL DAILY
Qty: 30 TABLET | Refills: 0 | Status: SHIPPED | OUTPATIENT
Start: 2017-10-19

## 2017-10-18 RX ORDER — PANTOPRAZOLE SODIUM 40 MG/1
1 GRANULE, DELAYED RELEASE ORAL DAILY
Qty: 90 EACH | Refills: 0 | Status: SHIPPED | OUTPATIENT
Start: 2017-10-18 | End: 2017-11-17

## 2017-10-18 RX ORDER — TRAMADOL HYDROCHLORIDE 50 MG/1
50 TABLET ORAL EVERY 6 HOURS PRN
Qty: 60 TABLET | Refills: 1 | Status: SHIPPED | OUTPATIENT
Start: 2017-10-18 | End: 2018-05-15

## 2017-10-18 NOTE — PLAN OF CARE
Problem: Patient/Family Goals  Goal: Patient/Family Long Term Goal  Patient's Long Term Goal: Wound healing    Interventions:  - wound care consult  -santyl as ordered  -surgical consult for possible debridement  -id consult  -continue iv antibiotics  - Se patient safety including physical limitations  - Instruct pt to call for assistance with activity based on assessment  - Modify environment to reduce risk of injury  - Provide assistive devices as appropriate  - Consider OT/PT consult to assist with streng

## 2017-10-18 NOTE — CM/SW NOTE
LEONOR was informed that [facility] is able to accept pt and there is insurance authorization. LEONOR informed RN of this. Per [facility], there would be a bed available today. 2:00PM: LEONOR was informed that pt is able to d/c today.  [facility]

## 2017-10-18 NOTE — TELEPHONE ENCOUNTER
Insurance is not entered correctly sent updated request to the referral department.  PSR working on insurance entry to show [address]

## 2017-10-18 NOTE — PROGRESS NOTES
[place] [facility] - [facility]    Progress Note    Roz Carranza Patient Status:  Inpatient    1950 MRN [id number]   Location [facility] 5SW/SE Attending Co, Carleen Lopez MD   Hosp Day # 8 PCP Vira Portillo MD            Subjective:     [facility] --    AST  20   --    --    ALT  19   --    --    BILT  0.5   --    --    TP  6.0   --    --          Assessment and Plan:   Decubitus ulcer sacrum  Decutitus uclers left foot  Decubitus ulcers right foot  Decubitus ulcer of right hip, unspecified ulcer

## 2017-10-18 NOTE — PROGRESS NOTES
120 [facility] dosing service    Follow-up Pharmacokinetic Consult for Vancomycin Dosing     Mo Delgado is a 69year old female who is being treated for cellulitis. Patient is on day 4 of Vancomycin 750 mg IV Q 48 hours.   Goal trough is 10-15 ug/m Vancomycin at 750 mg IVPB Q 48 hours (based on Trough of 20.2 ug/mL, pharmacokinetics, 54.7kg weight and renal function)    2. Pharmacy will re-check Vancomycin trough levels prior to 4th dose. Goal trough level 10-15 ug/mL.     3.  Pharmacy will need BUN

## 2017-10-18 NOTE — PLAN OF CARE
Problem: Patient/Family Goals  Goal: Patient/Family Long Term Goal  Patient's Long Term Goal: Wound healing    Interventions:  - wound care consult  -santyl as ordered  -surgical consult for possible debridement  -id consult  -continue iv antibiotics  - Se complete, and accurate information to patient/family  - Incorporate patient and family knowledge, values, beliefs, and cultural backgrounds into the planning and delivery of care  - Encourage patient/family to participate in care and decision-making at the

## 2017-10-18 NOTE — PROGRESS NOTES
[facility]    [facility] Note    Annmarie De Leon Patient Status:  Inpatient    1950 MRN [id number]   Location [facility] 5SW/SE Attending Co, Justyn Mac MD   Hosp Day # 8 PCP La Knowles MD       SUBJECTIVE:  No major co Intravenous Q48H   Pantoprazole Sodium (PROTONIX) 40 mg in Sodium Chloride 0.9 % 10 mL IV push 40 mg Intravenous Q24H   Cefepime HCl (MAXIPIME) 1 g in sodium chloride 0.9 % 100 mL IVPB 1 g Intravenous Q12H Peds   metRONIDAZOLE in NaCl (FLAGYL) 5 mg/ml IVPB atrial fibrillation (HCC)     Cognitive disorder     Encephalopathy     Essential hypertension     Facial droop     History of cerebrovascular accident     Positive RPR test     Leukocytosis     Pressure ulcer     Severe sepsis (HCC)     Mild recurrent rashawn

## 2017-10-18 NOTE — CM/SW NOTE
-CTL spoke with gilda Wyatt to transfer patient today to [facility].  SW notified  Message left for daughter Patrick Donald [phone]

## 2017-10-19 ENCOUNTER — TELEPHONE (OUTPATIENT)
Dept: INTERNAL MEDICINE CLINIC | Facility: CLINIC | Age: 67
End: 2017-10-19

## 2017-10-19 NOTE — TELEPHONE ENCOUNTER
sodium chloride 0.9 % SOLN 100 mL with Cefepime HCl 1 g SOLR 1 g 28 vial 0 10/18/2017 11/1/2017   Sig :  Inject 1 g into the vein Q12H Peds.      Route:   Intravenous     Class:   Print     Order #:   [id number]       [facility] does not have this medication

## 2017-10-19 NOTE — PROGRESS NOTES
INFECTIOUS DISEASE PROGRESS NOTE    Vinod Rockwell Patient Status:  Inpatient    1950 MRN [id number]   Location [facility] 5SW/SE Attending Co, Jamia Lutz MD   [facility] Day # 8 PCP Asim Avalos MD     Subjective:  Afebrile. Tolerating abx. Zosyn        ASSESSMENT:  Patient is a 22-year-old female with a history of dementia, COPD, CVA, hypertension, CAD status post aortic valve replacement, hyperlipidemia presents to the hospital on 10/10 with intractable pain of the decubitus ulcer with gene

## 2017-10-20 NOTE — DISCHARGE SUMMARY
[facility]    PATIENT'S NAME: Racquel Schaefer   ATTENDING PHYSICIAN: Renay Diaz MD   PATIENT ACCOUNT#:   [id number]    LOCATION:  [address] [place] [address] [address] RECORD #:   [id number]       YOB: 1950  ADMISSION DATE:       10/10/ She had been screaming due to pain. Appetite was poor. She denies fever. She is constipated. The patient was subsequently admitted for pain control, management of wound, and further evaluation per Social Service for transition to long-term care.      [facility] She will be on antibiotics for 2 more weeks. PICC line was inserted.     DISCHARGE MEDICATIONS:  Warfarin 6 mg at night to keep INR between 2 and 3, tramadol 50 mg q.6 h. p.r.n. for pain, DuoNeb 3 mL every 4 hours as needed, Florastor 250 mg twice a day, s

## 2017-10-23 ENCOUNTER — TELEPHONE (OUTPATIENT)
Dept: INTERNAL MEDICINE CLINIC | Facility: CLINIC | Age: 67
End: 2017-10-23

## 2017-10-23 NOTE — OPERATIVE REPORT
[facility]    PATIENT'S NAME: Maren Rowland   ATTENDING PHYSICIAN: Renay Diaz MD   OPERATING PHYSICIAN: Lyndon Sharp MD   PATIENT ACCOUNT#:   [id number]    LOCATION:  [address] [place] [address] #:   [id number]       DATE OF BIRTH: was prepped and draped in the usual sterile fashion. We started supine. First we did both feet. Both feet were prepped and draped as well as the hips in the usual sterile fashion.   Then full-thickness excision of skin and subcutaneous tissue, these were

## 2017-10-23 NOTE — TELEPHONE ENCOUNTER
Medication was sent to the pharmacy they cannot fill this medication, please advise if should be oral or needs to be canceled

## 2017-11-14 NOTE — PAYOR COMM NOTE
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 She was admitted to [facility] for sepsis in August 2016 through September 2016. She was later discharged to [facility] on October 3, 2017.   She has have vitamin C and zinc supplement. Dr. Melvina Escobar was consulted for debridement. The wound grew pseudomonas and Proteus mirabilis. Antibiotic  was later switched to vancomycin, cefepime, and metronidazole; this will be continued for 2 weeks.   Post debrideme

## 2017-11-21 NOTE — TELEPHONE ENCOUNTER
Called patient to inform her that she is due for a physical  No answer no way to leave a message   Letter sent out

## 2018-05-11 ENCOUNTER — TELEPHONE (OUTPATIENT)
Dept: INTERNAL MEDICINE CLINIC | Facility: CLINIC | Age: 68
End: 2018-05-11

## 2018-05-11 NOTE — TELEPHONE ENCOUNTER
Patient is discharged from rehab. No whas ruiz catheter. Requesting catheter change. P : Approved. But needs to see patient. If unable to come to clinic. Should have home physician following.

## 2018-05-15 ENCOUNTER — OFFICE VISIT (OUTPATIENT)
Dept: INTERNAL MEDICINE CLINIC | Facility: CLINIC | Age: 68
End: 2018-05-15

## 2018-05-15 VITALS
RESPIRATION RATE: 20 BRPM | SYSTOLIC BLOOD PRESSURE: 140 MMHG | HEART RATE: 98 BPM | OXYGEN SATURATION: 99 % | TEMPERATURE: 98 F | DIASTOLIC BLOOD PRESSURE: 70 MMHG

## 2018-05-15 DIAGNOSIS — F33.0 MILD RECURRENT MAJOR DEPRESSION (HCC): ICD-10-CM

## 2018-05-15 DIAGNOSIS — Z86.73 HISTORY OF CVA (CEREBROVASCULAR ACCIDENT): ICD-10-CM

## 2018-05-15 DIAGNOSIS — Z86.718 HISTORY OF DVT (DEEP VEIN THROMBOSIS): ICD-10-CM

## 2018-05-15 DIAGNOSIS — E44.0 MODERATE PROTEIN-CALORIE MALNUTRITION (HCC): ICD-10-CM

## 2018-05-15 DIAGNOSIS — I25.83 CORONARY ARTERY DISEASE DUE TO LIPID RICH PLAQUE: ICD-10-CM

## 2018-05-15 DIAGNOSIS — F02.81 DEMENTIA OF THE ALZHEIMER'S TYPE, WITH LATE ONSET, WITH DELUSIONS (HCC): ICD-10-CM

## 2018-05-15 DIAGNOSIS — I25.10 CORONARY ARTERY DISEASE DUE TO LIPID RICH PLAQUE: ICD-10-CM

## 2018-05-15 DIAGNOSIS — L89.150 DECUBITUS ULCER OF SACRAL REGION, UNSTAGEABLE (HCC): ICD-10-CM

## 2018-05-15 DIAGNOSIS — F22 DEMENTIA OF THE ALZHEIMER'S TYPE, WITH LATE ONSET, WITH DELUSIONS (HCC): ICD-10-CM

## 2018-05-15 DIAGNOSIS — D69.6 THROMBOCYTOPENIA (HCC): ICD-10-CM

## 2018-05-15 DIAGNOSIS — I73.9 PERIPHERAL VASCULAR DISEASE (HCC): ICD-10-CM

## 2018-05-15 DIAGNOSIS — I10 ESSENTIAL HYPERTENSION: ICD-10-CM

## 2018-05-15 DIAGNOSIS — I48.20 CHRONIC ATRIAL FIBRILLATION (HCC): Primary | ICD-10-CM

## 2018-05-15 DIAGNOSIS — D50.8 IRON DEFICIENCY ANEMIA SECONDARY TO INADEQUATE DIETARY IRON INTAKE: ICD-10-CM

## 2018-05-15 DIAGNOSIS — G30.1 DEMENTIA OF THE ALZHEIMER'S TYPE, WITH LATE ONSET, WITH DELUSIONS (HCC): ICD-10-CM

## 2018-05-15 DIAGNOSIS — Z95.2 S/P AORTIC VALVE REPLACEMENT: ICD-10-CM

## 2018-05-15 DIAGNOSIS — M62.81 MUSCLE WEAKNESS (GENERALIZED): ICD-10-CM

## 2018-05-15 DIAGNOSIS — L89.524 STAGE IV PRESSURE ULCER OF LEFT ANKLE (HCC): ICD-10-CM

## 2018-05-15 PROBLEM — D64.9 ANEMIA: Status: ACTIVE | Noted: 2018-05-15

## 2018-05-15 PROBLEM — F03.90 DEMENTIA (HCC): Status: ACTIVE | Noted: 2017-05-26

## 2018-05-15 PROBLEM — F32.A DEPRESSION: Status: ACTIVE | Noted: 2018-05-15

## 2018-05-15 PROBLEM — J96.00 RESPIRATORY FAILURE, ACUTE (HCC): Status: ACTIVE | Noted: 2017-12-06

## 2018-05-15 PROBLEM — F03.91 DEMENTIA WITH BEHAVIORAL DISTURBANCE (HCC): Status: ACTIVE | Noted: 2017-10-23

## 2018-05-15 PROCEDURE — 99215 OFFICE O/P EST HI 40 MIN: CPT | Performed by: INTERNAL MEDICINE

## 2018-05-15 RX ORDER — TRAMADOL HYDROCHLORIDE 50 MG/1
50 TABLET ORAL EVERY 6 HOURS PRN
Qty: 45 TABLET | Refills: 1 | Status: SHIPPED | OUTPATIENT
Start: 2018-05-15 | End: 2018-06-18

## 2018-05-15 NOTE — PROGRESS NOTES
HPI:    Patient ID: Sotero Medeiros is a 69year old female. HPI     Patient was last seen by on 10/10/2017.  She has multiple medical problems including: multiple pressure ulcers , atril fibrillation, s/p AVR, h/o DVT ,protein caloric malnutrition, by nebulization every 4 (four) hours as needed. Disp: 30 vial Rfl: 0   Warfarin Sodium 6 MG Oral Tab Take 1 tablet (6 mg total) by mouth nightly. Keep INR 2-3. (Patient taking differently: Take 4.5 mg by mouth daily.  Keep INR 2-3. ) Disp: 30 tablet Rfl: 0 Mouth/Throat: Oropharynx is clear and moist. No oropharyngeal exudate. Eyes: Conjunctivae and EOM are normal. Pupils are equal, round, and reactive to light. No scleral icterus. Neck: Normal range of motion. Neck supple. No JVD present.    Cardiovascu iron profile    S/p aortic valve replacement  Stable    Stage iv pressure ulcer of left ankle (hcc)  Wound cleansed   Dressing changed  Applied silvadene to wound .   Wound nurse consult for home care  Turn side to side   Heel foam      Muscle weakness (gen

## 2018-05-16 PROBLEM — F22 DEMENTIA OF THE ALZHEIMER'S TYPE, WITH LATE ONSET, WITH DELUSIONS (HCC): Status: ACTIVE | Noted: 2018-05-16

## 2018-05-16 PROBLEM — G30.1 DEMENTIA OF THE ALZHEIMER'S TYPE, WITH LATE ONSET, WITH DELUSIONS (HCC): Status: ACTIVE | Noted: 2018-05-16

## 2018-05-16 PROBLEM — F02.81 DEMENTIA OF THE ALZHEIMER'S TYPE, WITH LATE ONSET, WITH DELUSIONS (HCC): Status: ACTIVE | Noted: 2018-05-16

## 2018-05-16 RX ORDER — MIRTAZAPINE 15 MG/1
15 TABLET, FILM COATED ORAL NIGHTLY
Qty: 90 TABLET | Refills: 1 | Status: SHIPPED | OUTPATIENT
Start: 2018-05-16 | End: 2018-06-18

## 2018-05-17 ENCOUNTER — APPOINTMENT (OUTPATIENT)
Dept: LAB | Facility: HOSPITAL | Age: 68
End: 2018-05-17
Attending: INTERNAL MEDICINE
Payer: MEDICARE

## 2018-05-17 DIAGNOSIS — D50.8 IRON DEFICIENCY ANEMIA SECONDARY TO INADEQUATE DIETARY IRON INTAKE: ICD-10-CM

## 2018-05-17 DIAGNOSIS — I48.20 CHRONIC ATRIAL FIBRILLATION (HCC): ICD-10-CM

## 2018-05-17 DIAGNOSIS — E44.0 MODERATE PROTEIN-CALORIE MALNUTRITION (HCC): ICD-10-CM

## 2018-05-17 PROCEDURE — 84466 ASSAY OF TRANSFERRIN: CPT

## 2018-05-17 PROCEDURE — 80053 COMPREHEN METABOLIC PANEL: CPT

## 2018-05-17 PROCEDURE — 85610 PROTHROMBIN TIME: CPT

## 2018-05-17 PROCEDURE — 85025 COMPLETE CBC W/AUTO DIFF WBC: CPT

## 2018-05-17 PROCEDURE — 80061 LIPID PANEL: CPT

## 2018-05-17 PROCEDURE — 84443 ASSAY THYROID STIM HORMONE: CPT

## 2018-05-17 PROCEDURE — 36415 COLL VENOUS BLD VENIPUNCTURE: CPT

## 2018-05-17 PROCEDURE — 83540 ASSAY OF IRON: CPT

## 2018-06-03 DIAGNOSIS — Z51.81 THERAPEUTIC DRUG MONITORING: Primary | ICD-10-CM

## 2018-06-18 DIAGNOSIS — L89.150 DECUBITUS ULCER OF SACRAL REGION, UNSTAGEABLE (HCC): ICD-10-CM

## 2018-06-19 ENCOUNTER — TELEPHONE (OUTPATIENT)
Dept: INTERNAL MEDICINE CLINIC | Facility: CLINIC | Age: 68
End: 2018-06-19

## 2018-06-21 NOTE — TELEPHONE ENCOUNTER
Called patient and scheduled appointment for MA physical for Tuesday next week   Patient verbalized knowledge and understanding

## 2018-06-25 RX ORDER — IPRATROPIUM BROMIDE AND ALBUTEROL SULFATE 2.5; .5 MG/3ML; MG/3ML
3 SOLUTION RESPIRATORY (INHALATION) EVERY 4 HOURS PRN
Qty: 90 VIAL | Refills: 0 | Status: ON HOLD | OUTPATIENT
Start: 2018-06-25 | End: 2018-07-18 | Stop reason: ALTCHOICE

## 2018-06-25 RX ORDER — AMLODIPINE BESYLATE 10 MG/1
10 TABLET ORAL DAILY
Qty: 90 TABLET | Refills: 0 | Status: SHIPPED | OUTPATIENT
Start: 2018-06-25 | End: 2018-07-31

## 2018-06-25 RX ORDER — HYDRALAZINE HYDROCHLORIDE 50 MG/1
50 TABLET, FILM COATED ORAL 3 TIMES DAILY
Qty: 360 TABLET | Refills: 0 | Status: SHIPPED | OUTPATIENT
Start: 2018-06-25 | End: 2018-07-31

## 2018-06-25 RX ORDER — LISINOPRIL 2.5 MG/1
2.5 TABLET ORAL DAILY
Qty: 90 TABLET | Refills: 0 | Status: ON HOLD | OUTPATIENT
Start: 2018-06-25 | End: 2018-07-18 | Stop reason: ALTCHOICE

## 2018-06-25 RX ORDER — FERROUS SULFATE 325(65) MG
TABLET ORAL
Qty: 180 TABLET | Refills: 0 | Status: SHIPPED | OUTPATIENT
Start: 2018-06-25 | End: 2018-07-31

## 2018-06-25 RX ORDER — TRAMADOL HYDROCHLORIDE 50 MG/1
50 TABLET ORAL EVERY 6 HOURS PRN
Qty: 45 TABLET | Refills: 1 | Status: SHIPPED | OUTPATIENT
Start: 2018-06-25 | End: 2018-07-31

## 2018-06-25 RX ORDER — METOPROLOL SUCCINATE 50 MG/1
50 TABLET, EXTENDED RELEASE ORAL DAILY
Qty: 90 TABLET | Refills: 0 | Status: SHIPPED | OUTPATIENT
Start: 2018-06-25 | End: 2018-07-31

## 2018-06-25 RX ORDER — POLYETHYLENE GLYCOL 3350 17 G/17G
17 POWDER, FOR SOLUTION ORAL DAILY
Qty: 90 BOTTLE | Refills: 0 | Status: SHIPPED | OUTPATIENT
Start: 2018-06-25

## 2018-06-25 RX ORDER — QUETIAPINE 25 MG/1
12.5 TABLET, FILM COATED ORAL 2 TIMES DAILY
Qty: 90 TABLET | Refills: 0 | Status: SHIPPED | OUTPATIENT
Start: 2018-06-25 | End: 2018-06-25

## 2018-06-25 RX ORDER — ZINC SULFATE 50(220)MG
220 CAPSULE ORAL DAILY
Qty: 90 CAPSULE | Refills: 0 | Status: SHIPPED | OUTPATIENT
Start: 2018-06-25

## 2018-06-25 RX ORDER — MIRTAZAPINE 15 MG/1
15 TABLET, FILM COATED ORAL NIGHTLY
Qty: 90 TABLET | Refills: 1 | Status: SHIPPED | OUTPATIENT
Start: 2018-06-25 | End: 2018-07-31

## 2018-06-25 RX ORDER — CYCLOBENZAPRINE HCL 5 MG
5 TABLET ORAL 3 TIMES DAILY PRN
Qty: 270 TABLET | Refills: 0 | Status: SHIPPED | OUTPATIENT
Start: 2018-06-25

## 2018-06-25 RX ORDER — AMOXICILLIN 250 MG
1 CAPSULE ORAL NIGHTLY
Qty: 90 TABLET | Refills: 0 | Status: SHIPPED | OUTPATIENT
Start: 2018-06-25

## 2018-06-25 RX ORDER — SACCHAROMYCES BOULARDII 250 MG
250 CAPSULE ORAL 2 TIMES DAILY
Qty: 180 CAPSULE | Refills: 0 | Status: ON HOLD | OUTPATIENT
Start: 2018-06-25 | End: 2018-07-18 | Stop reason: ALTCHOICE

## 2018-06-25 RX ORDER — SIMVASTATIN 10 MG
TABLET ORAL
Qty: 90 TABLET | Refills: 1 | Status: ON HOLD | OUTPATIENT
Start: 2018-06-25 | End: 2018-07-18 | Stop reason: ALTCHOICE

## 2018-06-26 RX ORDER — QUETIAPINE 25 MG/1
12.5 TABLET, FILM COATED ORAL 2 TIMES DAILY
Qty: 90 TABLET | Refills: 0 | Status: SHIPPED | OUTPATIENT
Start: 2018-06-26 | End: 2018-06-27

## 2018-06-27 RX ORDER — QUETIAPINE 25 MG/1
12.5 TABLET, FILM COATED ORAL 2 TIMES DAILY
Qty: 90 TABLET | Refills: 0 | Status: SHIPPED | OUTPATIENT
Start: 2018-06-27 | End: 2018-07-02

## 2018-07-04 RX ORDER — QUETIAPINE 25 MG/1
12.5 TABLET, FILM COATED ORAL 2 TIMES DAILY
Qty: 90 TABLET | Refills: 0 | Status: ON HOLD | OUTPATIENT
Start: 2018-07-04 | End: 2018-07-22

## 2018-07-17 ENCOUNTER — APPOINTMENT (OUTPATIENT)
Dept: GENERAL RADIOLOGY | Facility: HOSPITAL | Age: 68
DRG: 698 | End: 2018-07-17
Attending: EMERGENCY MEDICINE
Payer: MEDICARE

## 2018-07-17 ENCOUNTER — HOSPITAL ENCOUNTER (INPATIENT)
Facility: HOSPITAL | Age: 68
LOS: 4 days | Discharge: HOME HEALTH | DRG: 698 | End: 2018-07-22
Attending: EMERGENCY MEDICINE | Admitting: HOSPITALIST
Payer: MEDICARE

## 2018-07-17 DIAGNOSIS — N30.01 ACUTE CYSTITIS WITH HEMATURIA: ICD-10-CM

## 2018-07-17 DIAGNOSIS — R50.9 FEVER, UNSPECIFIED FEVER CAUSE: Primary | ICD-10-CM

## 2018-07-17 DIAGNOSIS — A41.9 SEPSIS, DUE TO UNSPECIFIED ORGANISM: ICD-10-CM

## 2018-07-17 LAB
ANION GAP SERPL CALC-SCNC: 9 MMOL/L (ref 0–18)
BASOPHILS # BLD: 0.2 K/UL (ref 0–0.2)
BASOPHILS NFR BLD: 1 %
BUN SERPL-MCNC: 34 MG/DL (ref 8–20)
BUN/CREAT SERPL: 27.4 (ref 10–20)
CALCIUM SERPL-MCNC: 10.9 MG/DL (ref 8.5–10.5)
CHLORIDE SERPL-SCNC: 111 MMOL/L (ref 95–110)
CO2 SERPL-SCNC: 23 MMOL/L (ref 22–32)
CREAT SERPL-MCNC: 1.24 MG/DL (ref 0.5–1.5)
EOSINOPHIL # BLD: 0.1 K/UL (ref 0–0.7)
EOSINOPHIL NFR BLD: 1 %
ERYTHROCYTE [DISTWIDTH] IN BLOOD BY AUTOMATED COUNT: 17 % (ref 11–15)
GLUCOSE SERPL-MCNC: 101 MG/DL (ref 70–99)
HCT VFR BLD AUTO: 35.8 % (ref 35–48)
HGB BLD-MCNC: 11.3 G/DL (ref 12–16)
LACTATE SERPL-SCNC: 1.6 MMOL/L (ref 0.5–2.2)
LYMPHOCYTES # BLD: 4.2 K/UL (ref 1–4)
LYMPHOCYTES NFR BLD: 31 %
MCH RBC QN AUTO: 25.1 PG (ref 27–32)
MCHC RBC AUTO-ENTMCNC: 31.4 G/DL (ref 32–37)
MCV RBC AUTO: 79.9 FL (ref 80–100)
MONOCYTES # BLD: 1.1 K/UL (ref 0–1)
MONOCYTES NFR BLD: 8 %
NEUTROPHILS # BLD AUTO: 8 K/UL (ref 1.8–7.7)
NEUTROPHILS NFR BLD: 59 %
OSMOLALITY UR CALC.SUM OF ELEC: 304 MOSM/KG (ref 275–295)
PLATELET # BLD AUTO: 390 K/UL (ref 140–400)
PMV BLD AUTO: 8.9 FL (ref 7.4–10.3)
POTASSIUM SERPL-SCNC: 3.8 MMOL/L (ref 3.3–5.1)
RBC # BLD AUTO: 4.48 M/UL (ref 3.7–5.4)
SODIUM SERPL-SCNC: 143 MMOL/L (ref 136–144)
WBC # BLD AUTO: 13.6 K/UL (ref 4–11)

## 2018-07-17 PROCEDURE — 71045 X-RAY EXAM CHEST 1 VIEW: CPT | Performed by: EMERGENCY MEDICINE

## 2018-07-18 PROBLEM — R50.9 FEVER, UNSPECIFIED FEVER CAUSE: Status: ACTIVE | Noted: 2018-07-18

## 2018-07-18 PROBLEM — A41.9 SEPSIS, DUE TO UNSPECIFIED ORGANISM: Status: ACTIVE | Noted: 2018-07-18

## 2018-07-18 PROBLEM — N30.01 ACUTE CYSTITIS WITH HEMATURIA: Status: ACTIVE | Noted: 2018-07-18

## 2018-07-18 LAB
ALBUMIN SERPL BCP-MCNC: 2.1 G/DL (ref 3.5–4.8)
ALBUMIN/GLOB SERPL: 0.6 {RATIO} (ref 1–2)
ALP SERPL-CCNC: 69 U/L (ref 32–100)
ALT SERPL-CCNC: 9 U/L (ref 14–54)
ANION GAP SERPL CALC-SCNC: 5 MMOL/L (ref 0–18)
APTT PPP: 46 SECONDS (ref 23.2–35.3)
AST SERPL-CCNC: 16 U/L (ref 15–41)
BASOPHILS # BLD: 0.1 K/UL (ref 0–0.2)
BASOPHILS NFR BLD: 1 %
BILIRUB SERPL-MCNC: 0.9 MG/DL (ref 0.3–1.2)
BILIRUB UR QL: NEGATIVE
BUN SERPL-MCNC: 30 MG/DL (ref 8–20)
BUN/CREAT SERPL: 28 (ref 10–20)
CALCIUM SERPL-MCNC: 9.4 MG/DL (ref 8.5–10.5)
CHLORIDE SERPL-SCNC: 118 MMOL/L (ref 95–110)
CO2 SERPL-SCNC: 22 MMOL/L (ref 22–32)
COLOR UR: YELLOW
CREAT SERPL-MCNC: 1.07 MG/DL (ref 0.5–1.5)
EOSINOPHIL # BLD: 0.1 K/UL (ref 0–0.7)
EOSINOPHIL NFR BLD: 1 %
ERYTHROCYTE [DISTWIDTH] IN BLOOD BY AUTOMATED COUNT: 16.9 % (ref 11–15)
GLOBULIN PLAS-MCNC: 3.6 G/DL (ref 2.5–3.7)
GLUCOSE SERPL-MCNC: 85 MG/DL (ref 70–99)
GLUCOSE UR-MCNC: NEGATIVE MG/DL
HCT VFR BLD AUTO: 27.7 % (ref 35–48)
HGB BLD-MCNC: 8.7 G/DL (ref 12–16)
HYALINE CASTS #/AREA URNS AUTO: 14 /LPF
INR BLD: 1.9 (ref 0.9–1.2)
LYMPHOCYTES # BLD: 2.2 K/UL (ref 1–4)
LYMPHOCYTES NFR BLD: 23 %
MCH RBC QN AUTO: 25.5 PG (ref 27–32)
MCHC RBC AUTO-ENTMCNC: 31.4 G/DL (ref 32–37)
MCV RBC AUTO: 81.2 FL (ref 80–100)
MONOCYTES # BLD: 1.2 K/UL (ref 0–1)
MONOCYTES NFR BLD: 12 %
NEUTROPHILS # BLD AUTO: 6 K/UL (ref 1.8–7.7)
NEUTROPHILS NFR BLD: 62 %
NITRITE UR QL STRIP.AUTO: POSITIVE
OSMOLALITY UR CALC.SUM OF ELEC: 305 MOSM/KG (ref 275–295)
PH UR: 6 [PH] (ref 5–8)
PHOSPHATE SERPL-MCNC: 2.4 MG/DL (ref 2.4–4.7)
PLATELET # BLD AUTO: 269 K/UL (ref 140–400)
PMV BLD AUTO: 8.4 FL (ref 7.4–10.3)
POTASSIUM SERPL-SCNC: 3.4 MMOL/L (ref 3.3–5.1)
PROT SERPL-MCNC: 5.7 G/DL (ref 5.9–8.4)
PROT UR-MCNC: 100 MG/DL
PROTHROMBIN TIME: 21 SECONDS (ref 11.8–14.5)
RBC # BLD AUTO: 3.41 M/UL (ref 3.7–5.4)
RBC #/AREA URNS AUTO: 26 /HPF
SODIUM SERPL-SCNC: 145 MMOL/L (ref 136–144)
SP GR UR STRIP: 1.02 (ref 1–1.03)
UROBILINOGEN UR STRIP-ACNC: <2
VIT C UR-MCNC: NEGATIVE MG/DL
WBC # BLD AUTO: 9.6 K/UL (ref 4–11)
WBC #/AREA URNS AUTO: 809 /HPF

## 2018-07-18 PROCEDURE — 99223 1ST HOSP IP/OBS HIGH 75: CPT | Performed by: HOSPITALIST

## 2018-07-18 RX ORDER — AMLODIPINE BESYLATE 10 MG/1
10 TABLET ORAL DAILY
Status: DISCONTINUED | OUTPATIENT
Start: 2018-07-18 | End: 2018-07-22

## 2018-07-18 RX ORDER — POLYETHYLENE GLYCOL 3350 17 G/17G
17 POWDER, FOR SOLUTION ORAL DAILY
Status: DISCONTINUED | OUTPATIENT
Start: 2018-07-19 | End: 2018-07-22

## 2018-07-18 RX ORDER — ATORVASTATIN CALCIUM 10 MG/1
10 TABLET, FILM COATED ORAL DAILY
Status: DISCONTINUED | OUTPATIENT
Start: 2018-07-18 | End: 2018-07-18

## 2018-07-18 RX ORDER — QUETIAPINE 25 MG/1
12.5 TABLET, FILM COATED ORAL 2 TIMES DAILY
Status: DISCONTINUED | OUTPATIENT
Start: 2018-07-18 | End: 2018-07-18

## 2018-07-18 RX ORDER — SENNA AND DOCUSATE SODIUM 50; 8.6 MG/1; MG/1
1 TABLET, FILM COATED ORAL NIGHTLY
Status: DISCONTINUED | OUTPATIENT
Start: 2018-07-18 | End: 2018-07-22

## 2018-07-18 RX ORDER — MEMANTINE HYDROCHLORIDE 5 MG/1
5 TABLET ORAL DAILY
Status: DISCONTINUED | OUTPATIENT
Start: 2018-07-18 | End: 2018-07-22

## 2018-07-18 RX ORDER — POLYETHYLENE GLYCOL 3350 17 G/17G
17 POWDER, FOR SOLUTION ORAL DAILY
Status: DISCONTINUED | OUTPATIENT
Start: 2018-07-18 | End: 2018-07-18 | Stop reason: RX

## 2018-07-18 RX ORDER — ARIPIPRAZOLE 15 MG/1
40 TABLET ORAL EVERY 4 HOURS
Status: COMPLETED | OUTPATIENT
Start: 2018-07-18 | End: 2018-07-18

## 2018-07-18 RX ORDER — HYDRALAZINE HYDROCHLORIDE 50 MG/1
50 TABLET, FILM COATED ORAL 3 TIMES DAILY
Status: DISCONTINUED | OUTPATIENT
Start: 2018-07-18 | End: 2018-07-22

## 2018-07-18 RX ORDER — ATORVASTATIN CALCIUM 10 MG/1
10 TABLET, FILM COATED ORAL NIGHTLY
Status: DISCONTINUED | OUTPATIENT
Start: 2018-07-18 | End: 2018-07-22

## 2018-07-18 RX ORDER — ACETAMINOPHEN 325 MG/1
650 TABLET ORAL EVERY 6 HOURS PRN
Status: DISCONTINUED | OUTPATIENT
Start: 2018-07-18 | End: 2018-07-22

## 2018-07-18 RX ORDER — MIRTAZAPINE 15 MG/1
15 TABLET, FILM COATED ORAL DAILY
Status: DISCONTINUED | OUTPATIENT
Start: 2018-07-18 | End: 2018-07-22

## 2018-07-18 RX ORDER — CYCLOBENZAPRINE HCL 10 MG
5 TABLET ORAL 3 TIMES DAILY PRN
Status: DISCONTINUED | OUTPATIENT
Start: 2018-07-18 | End: 2018-07-22

## 2018-07-18 RX ORDER — SODIUM CHLORIDE 0.9 % (FLUSH) 0.9 %
3 SYRINGE (ML) INJECTION AS NEEDED
Status: DISCONTINUED | OUTPATIENT
Start: 2018-07-18 | End: 2018-07-22

## 2018-07-18 RX ORDER — TRAMADOL HYDROCHLORIDE 50 MG/1
50 TABLET ORAL EVERY 6 HOURS PRN
Status: DISCONTINUED | OUTPATIENT
Start: 2018-07-18 | End: 2018-07-22

## 2018-07-18 RX ORDER — DIPHENOXYLATE HYDROCHLORIDE AND ATROPINE SULFATE 2.5; .025 MG/1; MG/1
1 TABLET ORAL DAILY
Status: DISCONTINUED | OUTPATIENT
Start: 2018-07-18 | End: 2018-07-21

## 2018-07-18 RX ORDER — SODIUM CHLORIDE 9 MG/ML
INJECTION, SOLUTION INTRAVENOUS
Status: COMPLETED
Start: 2018-07-18 | End: 2018-07-18

## 2018-07-18 RX ORDER — METOPROLOL SUCCINATE 50 MG/1
50 TABLET, EXTENDED RELEASE ORAL DAILY
Status: DISCONTINUED | OUTPATIENT
Start: 2018-07-18 | End: 2018-07-22

## 2018-07-18 RX ORDER — SODIUM CHLORIDE 9 MG/ML
INJECTION, SOLUTION INTRAVENOUS CONTINUOUS
Status: DISCONTINUED | OUTPATIENT
Start: 2018-07-18 | End: 2018-07-19

## 2018-07-18 RX ORDER — MEMANTINE HYDROCHLORIDE 10 MG/1
5 TABLET ORAL DAILY
COMMUNITY
End: 2018-07-31

## 2018-07-18 RX ORDER — NITROGLYCERIN 0.4 MG/1
0.4 TABLET SUBLINGUAL EVERY 5 MIN PRN
Status: DISCONTINUED | OUTPATIENT
Start: 2018-07-18 | End: 2018-07-22

## 2018-07-18 RX ORDER — MELATONIN
325 2 TIMES DAILY
Status: DISCONTINUED | OUTPATIENT
Start: 2018-07-18 | End: 2018-07-22

## 2018-07-18 RX ORDER — ASCORBIC ACID 500 MG
500 TABLET ORAL DAILY
Status: DISCONTINUED | OUTPATIENT
Start: 2018-07-18 | End: 2018-07-22

## 2018-07-18 RX ORDER — ATORVASTATIN CALCIUM 10 MG/1
10 TABLET, FILM COATED ORAL DAILY
COMMUNITY
End: 2018-07-31

## 2018-07-18 NOTE — ED NOTES
Pt came from nursing home with a ruiz catheter in place. Drainage bag noted to be empty. Pt's grand daughter stated pt often unknowingly voids around the catheter. This RN removed current ruiz catheter and replaced with a new 16 [place] ruiz.  Approximate

## 2018-07-18 NOTE — ED PROVIDER NOTES
Patient Seen in: [facility] AND [facility] Emergency Department    History   No chief complaint on file. Stated Complaint: AMS    HPI    History is provided by patient's granddaughter.     56-year-old female with history of dementia, hypertension, COPD, previo Constitutional: No distress. Chronically ill appearing, cachectic   HENT:   Head: Normocephalic and atraumatic. Dry mucous membranes   Eyes: EOM are normal. Pupils are equal, round, and reactive to light. Neck: Normal range of motion.    Cardiovascu Creatinine 1.24 0.50 - 1.50 mg/dL   Calcium, Total 10.9 (H) 8.5 - 10.5 mg/dL   BUN/CREA Ratio 27.4 (H) 10.0 - 20.0   Anion Gap 9 0 - 18 mmol/L   Calculated Osmolality 304 (H) 275 - 295 mOsm/kg   GFR, Non- 44 (L) >=60   GFR, -Americ 5 /HPF   WBC Clump Many (A) None seen /HPF   RBC URINE 26 (H) 0 - 3 /HPF   Bacteria Urine Many (A) None Seen /HPF       Imaging Results Available and Reviewed by me while in ED:  Xr Chest Ap Portable  (cpt=71045)    Result Date: 7/17/2018  CONCLUSION:  1. Right: Clear  Left: Clear    Peripheral Pulses:  Radial: Right 1+ or Left 1+      Capillary Refill:  <3 Secs    Skin:  Temp/Moisture: Warm and Dry  Color: Normal      Tilmon Hoof  7/18/2018  12:16 AM            The patient was informed of their elevated multiple initial diagnoses were considered based on the presenting problem including sepsis, UTI, nephrolithiasis, pneumonia.     Critical Care Time:  Total critical care time for this patient was 46 minutes exclusive of separately billable procedures, but

## 2018-07-18 NOTE — H&P
[facility]    PATIENT'S NAME: Virginia Mcnulty   ATTENDING PHYSICIAN: Re Zheng MD   PATIENT ACCOUNT#:   [de-identified]    LOCATION:  [address] [place] [address] RECORD #:   [id number]       YOB: 1950  ADMISSION DATE:       07/17/2018 glucose was 101 with a sodium of 143, potassium of 3.8, chloride of 111, CO2 of 23, BUN 34, with a creatinine of 1.24. Calcium of 10.9 and an anion gap of 9. Lactic acid level of 1.6.   Chest x-ray revealed borderline cardiomegaly, which was improved, no unit.  It appears that some of that stay was because the family did not feel able to care for her at home. REVIEW OF SYSTEMS:  The patient is unable to provide this.   The granddaughter stated that the patient was having a bowel movement on a daily basis There is a sacral wound, which is 7.4 x 3.5 cm with no depth. It has a scant serosanguineous drainage. There is heel redness measuring 1.2 x 2 cm, with no opening.   There is an eschar, which is dry and unstageable on the right mid-foot, and there is a cl on what type of valve this was or whether or not the patient should have an INR in the higher range. Perhaps further information can be obtained about this later today. 4.   Chronic kidney disease, stage 3.   Creatinine was actually slightly improved over information; however, I asked her to have her mother contact the daytime hospitalist to discuss the patient's current status and level of care and to verify a code status. At the present time, she remains Full Code.     Dictated By Jill Shaffer,

## 2018-07-18 NOTE — PLAN OF CARE
Problem: Patient/Family Goals  Goal: Patient/Family Long Term Goal  Patient's Long Term Goal: To be discharged home    Interventions:  - monitor vs,test results  -administer meds  -follow md orders/poc    - See additional Care Plan goals for specific inter air mattress  Goal: Incision(s), wounds(s) or drain site(s) healing without S/S of infection  INTERVENTIONS:  - Assess and document risk factors for pressure ulcer development  - Assess and document skin integrity  - Assess and document dressing/incision, choices   Outcome: Progressing  Granddaughter at the bedside    Comments: Pt is alert to self. Answers some questions. Contacted. Repositioned as tolerated. Afebrile. Receiving IV Zosyn. All needs attended to. Will continue to monitor patient.

## 2018-07-18 NOTE — DIETARY NOTE
ADULT NUTRITION INITIAL ASSESSMENT    Pt is at high nutrition risk. Pt meets malnutrition criteria.       CRITERIA FOR MALNUTRITION DIAGNOSIS:  Criteria for severe malnutrition diagnosis: chronic illness related to wt loss greater than 20% in 1 year, body to order meals for pt while in hospital to enhance pt acceptance.      NUTRITION INTERVENTION:  - RD Malnutrition Care Plan: Encouraged small frequent meals with emphasis on high calorie/high protein and Initiated ONS (oral nutritional supplements)    - Marlyn No  Cultural/Ethnic/Baptist Preferences: Not Obtained    MEDICATIONS: reviewed  • piperacillin-tazobactam  3.375 g Intravenous Q8H   • pantoprazole (PROTONIX) IV push  40 mg Intravenous Daily   • AmLODIPine Besylate  10 mg Oral Daily   • Cholecalciferol supplement greater than 75% of needs, intake to meet needs, labs WNL and promote healing      DIETITIAN FOLLOW UP: RD to follow up within 5 days

## 2018-07-18 NOTE — PROGRESS NOTES
ADMISSION NOTE    77year old female with multiple decubitus ulcers, wheel chair bound bedridden with chronic indwelling ruiz catheter  presents with UTI/sepsis. Available medical records partially reviewed. Dictation to follow. Carmen HAHN

## 2018-07-18 NOTE — CM/SW NOTE
SW attempted to call pt's dtr to discuss discharge planning - phone line appeared w/ out of service noise. SW met w/ pt's gdtr in pt's room. Gdtr stated pt lives at home w/ her and pt's dtr. Gdtr stated pt is mostly bedbound at home.  Gdtr stated that h

## 2018-07-18 NOTE — WOUND PROGRESS NOTE
WOUND CARE NOTE      PLAN   Recommendations:  Turn schedules  Specialty bed: 1st step  Heels elevated using pillows, heel wedge or heel boots to offload heels  Prompt incontinence care  Moisture barrier for incontinence.  May consider aloe vesta    Wound( placed between her knees and under the right side. Allergies: Patient has no known allergies.     Labs:     Lab Results  Component Value Date   WBC 9.6 07/18/2018   HGB 8.7 (L) 07/18/2018   HCT 27.7 (L) 07/18/2018    07/18/2018   CREATSERUM 1.07

## 2018-07-18 NOTE — ED INITIAL ASSESSMENT (HPI)
According to pt's grand daughter around noon pt began to become confused and lethargic, sleeping, not eating. Pt's baseline is AOx4. At this time pt is AOx3 bu tspeaking very quietly which is normal for pt according to grand daughter.

## 2018-07-19 LAB
INR BLD: 2.5 (ref 0.9–1.2)
PHOSPHATE SERPL-MCNC: 1.6 MG/DL (ref 2.4–4.7)
POTASSIUM SERPL-SCNC: 4.7 MMOL/L (ref 3.3–5.1)
PROTHROMBIN TIME: 26.6 SECONDS (ref 11.8–14.5)

## 2018-07-19 PROCEDURE — 99233 SBSQ HOSP IP/OBS HIGH 50: CPT | Performed by: HOSPITALIST

## 2018-07-19 RX ORDER — MELATONIN
100 DAILY
Status: DISCONTINUED | OUTPATIENT
Start: 2018-07-19 | End: 2018-07-22

## 2018-07-19 RX ORDER — MULTIPLE VITAMINS W/ MINERALS TAB 9MG-400MCG
1 TAB ORAL DAILY
Status: DISCONTINUED | OUTPATIENT
Start: 2018-07-19 | End: 2018-07-22

## 2018-07-19 NOTE — PROGRESS NOTES
[facility]  Progress Note     Annmarie De Leon  : 1950    Status: Inpatient  Day #: 1    Attending: Cassandra Reeder MD  PCP: La Knowles MD      Assessment and Plan     Acute UTI with early sepsis  -con't zosyn IV  -f/u ucx - +GNR  -f 07/18/18   1405  07/18/18   2355  07/19/18   0627   BUN  34*   --    --   30*   --    --    --    CREATSERUM  1.24   --    --   1.07   --    --    --    GFRAA  51*   --    --   >60   --    --    --    GFRNAA  44*   --    --   53*   --    --    --    CA  10 Oral Nightly   • ascorbic acid  500 mg Oral Daily   • atorvastatin  10 mg Oral Nightly   • collagenase   Topical Daily   • PEG 3350  17 g Oral Daily   • Warfarin Sodium  4.5 mg Oral Nightly   • sodium chloride 0.9%  1,000 mL Intravenous Once      PRN Meds:

## 2018-07-19 NOTE — DIETARY NOTE
Nutrition Note Update      Followed up.  Spoke with DTKAMI Aponte [phone]) over the phone, discussed with her Nutrition plans and options re: Nutrition Support (TF) to address inadequate/poor po with severe wt loss, pressure ulcers, and Severe Malnutr

## 2018-07-19 NOTE — PLAN OF CARE
Problem: Patient/Family Goals  Goal: Patient/Family Long Term Goal  Patient's Long Term Goal: To be discharged home    Interventions:  - monitor vs,test results  -administer meds  -follow md orders/poc    - See additional Care Plan goals for specific inter without S/S of infection  INTERVENTIONS:  - Assess and document risk factors for pressure ulcer development  - Assess and document skin integrity  - Assess and document dressing/incision, wound bed, drain sites and surrounding tissue  - Implement wound car

## 2018-07-19 NOTE — CDS QUERY
CDI UPDATE: Dr. Manuel Kenney documented severe malnutrition in progress note dated 07/19 at [facility]  Dear Doctor:  Manuel Kenney    A Registered Dietician evaluated this patient and found them to have SEVERE MALNUTRITION usi

## 2018-07-19 NOTE — PLAN OF CARE
Problem: Patient/Family Goals  Goal: Patient/Family Long Term Goal  Patient's Long Term Goal: To be discharged home    Interventions:  - monitor vs,test results  -administer meds  -follow md orders/poc    - See additional Care Plan goals for specific inter healing without S/S of infection  INTERVENTIONS:  - Assess and document risk factors for pressure ulcer development  - Assess and document skin integrity  - Assess and document dressing/incision, wound bed, drain sites and surrounding tissue  - Implement w Honor patient and family perspectives and choices   Outcome: Progressing  Pt is more awake, yulia to respond to some questions.

## 2018-07-20 LAB
ANION GAP SERPL CALC-SCNC: 5 MMOL/L (ref 0–18)
BASOPHILS # BLD: 0.1 K/UL (ref 0–0.2)
BASOPHILS NFR BLD: 1 %
BUN SERPL-MCNC: 22 MG/DL (ref 8–20)
BUN/CREAT SERPL: 18.6 (ref 10–20)
CALCIUM SERPL-MCNC: 9.3 MG/DL (ref 8.5–10.5)
CHLORIDE SERPL-SCNC: 115 MMOL/L (ref 95–110)
CO2 SERPL-SCNC: 21 MMOL/L (ref 22–32)
CREAT SERPL-MCNC: 1.18 MG/DL (ref 0.5–1.5)
EOSINOPHIL # BLD: 0.3 K/UL (ref 0–0.7)
EOSINOPHIL NFR BLD: 3 %
ERYTHROCYTE [DISTWIDTH] IN BLOOD BY AUTOMATED COUNT: 17.4 % (ref 11–15)
GLUCOSE SERPL-MCNC: 118 MG/DL (ref 70–99)
HCT VFR BLD AUTO: 25.5 % (ref 35–48)
HGB BLD-MCNC: 7.9 G/DL (ref 12–16)
INR BLD: 2.7 (ref 0.9–1.2)
LYMPHOCYTES # BLD: 1.7 K/UL (ref 1–4)
LYMPHOCYTES NFR BLD: 18 %
MCH RBC QN AUTO: 25.5 PG (ref 27–32)
MCHC RBC AUTO-ENTMCNC: 31 G/DL (ref 32–37)
MCV RBC AUTO: 82.3 FL (ref 80–100)
MONOCYTES # BLD: 1.2 K/UL (ref 0–1)
MONOCYTES NFR BLD: 12 %
NEUTROPHILS # BLD AUTO: 6.1 K/UL (ref 1.8–7.7)
NEUTROPHILS NFR BLD: 65 %
OSMOLALITY UR CALC.SUM OF ELEC: 296 MOSM/KG (ref 275–295)
PHOSPHATE SERPL-MCNC: 2.9 MG/DL (ref 2.4–4.7)
PLATELET # BLD AUTO: 273 K/UL (ref 140–400)
PMV BLD AUTO: 8 FL (ref 7.4–10.3)
POTASSIUM SERPL-SCNC: 4.7 MMOL/L (ref 3.3–5.1)
PROTHROMBIN TIME: 28.2 SECONDS (ref 11.8–14.5)
RBC # BLD AUTO: 3.1 M/UL (ref 3.7–5.4)
SODIUM SERPL-SCNC: 141 MMOL/L (ref 136–144)
WBC # BLD AUTO: 9.4 K/UL (ref 4–11)

## 2018-07-20 PROCEDURE — 99233 SBSQ HOSP IP/OBS HIGH 50: CPT | Performed by: HOSPITALIST

## 2018-07-20 RX ORDER — WARFARIN SODIUM 3 MG/1
3 TABLET ORAL NIGHTLY
Status: DISCONTINUED | OUTPATIENT
Start: 2018-07-20 | End: 2018-07-22

## 2018-07-20 RX ORDER — PANTOPRAZOLE SODIUM 40 MG/1
40 TABLET, DELAYED RELEASE ORAL
Status: DISCONTINUED | OUTPATIENT
Start: 2018-07-21 | End: 2018-07-22

## 2018-07-20 NOTE — PLAN OF CARE
Problem: Patient/Family Goals  Goal: Patient/Family Long Term Goal  Patient's Long Term Goal: To be discharged home    Interventions:  - monitor vs,test results  -administer meds  -follow md orders/poc    - See additional Care Plan goals for specific inter infection  INTERVENTIONS:  - Assess and document risk factors for pressure ulcer development  - Assess and document skin integrity  - Assess and document dressing/incision, wound bed, drain sites and surrounding tissue  - Implement wound care per orders  -

## 2018-07-20 NOTE — PROGRESS NOTES
[facility] Note: Route Optimization for Pantoprazole (PROTONIX)    Patient is currently on Pantoprazole (PROTONIX) 40 mg IV every 24 hours.    The patient meets the criteria to convert to the oral equivalent as established by the IV to Oral conversion pro

## 2018-07-20 NOTE — PLAN OF CARE
Problem: Patient/Family Goals  Goal: Patient/Family Long Term Goal  Patient's Long Term Goal: To be discharged home    Interventions:  - monitor vs,test results  -administer meds  -follow md orders/poc    - See additional Care Plan goals for specific inter RISK FOR INFECTION - ADULT  Goal: Absence of fever/infection during anticipated neutropenic period  INTERVENTIONS  - Monitor WBC  - Administer growth factors as ordered  - Implement neutropenic guidelines   Outcome: Progressing      Problem: SAFETY ADULT -

## 2018-07-20 NOTE — PROGRESS NOTES
[facility] HOSP - [facility]  Progress Note     Estella Rodriguez  : 1950    Status: Inpatient  Day #: 2    Attending: Eric Guerrier MD  PCP: Ashly Clarke MD      Assessment and Plan     Acute UTI with early sepsis  -ucx - +amanda collins 31.0*   RDW  17.0*  16.9*  17.4*     Recent Labs   Lab  07/17/18   2141  07/17/18   2142  07/18/18   0317  07/18/18   0708  07/18/18   1405  07/18/18   2355  07/19/18   0627  07/20/18   0610   BUN  34*   --    --   30*   --    --    --   22*   CREATSERUM Oral Daily   • Warfarin Sodium  4.5 mg Oral Nightly   • sodium chloride 0.9%  1,000 mL Intravenous Once      PRN Meds: Normal Saline Flush, Atropine Sulfate, nitroGLYCERIN, acetaminophen, Cyclobenzaprine HCl, TraMADol HCl      Spent <35 minutes, <50% of th

## 2018-07-21 LAB
ANION GAP SERPL CALC-SCNC: 4 MMOL/L (ref 0–18)
BASOPHILS # BLD: 0.1 K/UL (ref 0–0.2)
BASOPHILS NFR BLD: 1 %
BUN SERPL-MCNC: 21 MG/DL (ref 8–20)
BUN/CREAT SERPL: 19.6 (ref 10–20)
CALCIUM SERPL-MCNC: 9.4 MG/DL (ref 8.5–10.5)
CHLORIDE SERPL-SCNC: 111 MMOL/L (ref 95–110)
CO2 SERPL-SCNC: 24 MMOL/L (ref 22–32)
CREAT SERPL-MCNC: 1.07 MG/DL (ref 0.5–1.5)
EOSINOPHIL # BLD: 0.2 K/UL (ref 0–0.7)
EOSINOPHIL NFR BLD: 3 %
ERYTHROCYTE [DISTWIDTH] IN BLOOD BY AUTOMATED COUNT: 17.3 % (ref 11–15)
GLUCOSE SERPL-MCNC: 86 MG/DL (ref 70–99)
HCT VFR BLD AUTO: 26.1 % (ref 35–48)
HGB BLD-MCNC: 8.4 G/DL (ref 12–16)
INR BLD: 3 (ref 0.9–1.2)
LYMPHOCYTES # BLD: 2.2 K/UL (ref 1–4)
LYMPHOCYTES NFR BLD: 23 %
MCH RBC QN AUTO: 25.7 PG (ref 27–32)
MCHC RBC AUTO-ENTMCNC: 32.1 G/DL (ref 32–37)
MCV RBC AUTO: 79.9 FL (ref 80–100)
MONOCYTES # BLD: 1.2 K/UL (ref 0–1)
MONOCYTES NFR BLD: 13 %
NEUTROPHILS # BLD AUTO: 5.6 K/UL (ref 1.8–7.7)
NEUTROPHILS NFR BLD: 60 %
OSMOLALITY UR CALC.SUM OF ELEC: 290 MOSM/KG (ref 275–295)
PLATELET # BLD AUTO: 298 K/UL (ref 140–400)
PMV BLD AUTO: 8.4 FL (ref 7.4–10.3)
POTASSIUM SERPL-SCNC: 4.1 MMOL/L (ref 3.3–5.1)
PROTHROMBIN TIME: 30 SECONDS (ref 11.8–14.5)
RBC # BLD AUTO: 3.26 M/UL (ref 3.7–5.4)
SODIUM SERPL-SCNC: 139 MMOL/L (ref 136–144)
WBC # BLD AUTO: 9.2 K/UL (ref 4–11)

## 2018-07-21 PROCEDURE — 99232 SBSQ HOSP IP/OBS MODERATE 35: CPT | Performed by: HOSPITALIST

## 2018-07-21 NOTE — PROGRESS NOTES
[facility]  Progress Note     Mile Matos  : 1950    Status: Inpatient  Day #: 3    Attending: Petty Maloney MD  PCP: Mitali Pratt MD      Assessment and Plan     Acute UTI due to chronic indwelling ruiz catheter, present on ad 35.8  27.7*  25.5*   PLT  390  269  273   RBC  4.48  3.41*  3.10*   MCV  79.9*  81.2  82.3   MCH  25.1*  25.5*  25.5*   MCHC  31.4*  31.4*  31.0*   RDW  17.0*  16.9*  17.4*     Recent Labs   Lab  07/17/18   2141  07/17/18   2142  07/18/18   0317  07/18/18 acid  500 mg Oral Daily   • atorvastatin  10 mg Oral Nightly   • collagenase   Topical Daily   • PEG 3350  17 g Oral Daily   • sodium chloride 0.9%  1,000 mL Intravenous Once      PRN Meds: Normal Saline Flush, Atropine Sulfate, nitroGLYCERIN, acetaminophe

## 2018-07-21 NOTE — PLAN OF CARE
Problem: Patient/Family Goals  Goal: Patient/Family Long Term Goal  Patient's Long Term Goal: To be discharged home    Interventions:  - monitor vs,test results  -administer meds  -follow md orders/poc    - See additional Care Plan goals for specific inter development  - Assess and document skin integrity  - Assess and document dressing/incision, wound bed, drain sites and surrounding tissue  - Implement wound care per orders  - Initiate isolation precautions as appropriate  - Initiate Pressure Ulcer prevent

## 2018-07-22 VITALS
OXYGEN SATURATION: 99 % | HEART RATE: 87 BPM | BODY MASS INDEX: 16.2 KG/M2 | HEIGHT: 66 IN | TEMPERATURE: 98 F | RESPIRATION RATE: 20 BRPM | DIASTOLIC BLOOD PRESSURE: 62 MMHG | SYSTOLIC BLOOD PRESSURE: 130 MMHG | WEIGHT: 100.81 LBS

## 2018-07-22 PROCEDURE — 99239 HOSP IP/OBS DSCHRG MGMT >30: CPT | Performed by: HOSPITALIST

## 2018-07-22 NOTE — CM/SW NOTE
7/22/18 CM Discharge planning / MDO to resume home health  Atempted to contact dtr Gustavo Reynolds [phone] regarding discharge today and requested Xochilt Cuevas agency name, left message for dtr .   Mortimer Spiegel X [id number]

## 2018-07-22 NOTE — PLAN OF CARE
Problem: Patient/Family Goals  Goal: Patient/Family Long Term Goal  Patient's Long Term Goal: To be discharged home    Interventions:  - monitor vs,test results  -administer meds  -follow md orders/poc    - See additional Care Plan goals for specific inter ulcer development  - Assess and document skin integrity  - Assess and document dressing/incision, wound bed, drain sites and surrounding tissue  - Implement wound care per orders  - Initiate isolation precautions as appropriate  - Initiate Pressure Ulcer p

## 2018-07-22 NOTE — PLAN OF CARE
GENITOURINARY - ADULT    • Absence of urinary retention- ruiz in place Progressing        METABOLIC/FLUID AND ELECTROLYTES - ADULT    • Electrolytes maintained within normal limits Progressing        RISK FOR INFECTION - ADULT    • Absence of fever at [facility]

## 2018-07-22 NOTE — CM/SW NOTE
7/22/18 CM Discharge planning   Spoke with mariano, pt is current with [facility]. Dtr in agreementiwth pt returning home today, arranged transport home via [facility] [address] for 4:00 pm.  Family will be at home to accept pt.    Spoke with Laura Cortez at [employer],

## 2018-07-22 NOTE — DISCHARGE SUMMARY
[facility]  Discharge Summary     Bala Odonnell  : 1950    Status: Inpatient  Day #: 4    Attending: Maureen Zuleta MD  PCP: Jackeline Goldman MD     Date of Admission: 2018  Date of Discharge: 2018     Hospital Discharge Diag malnutrition  -dietician josefa     Other:  -CKD stage III  -h/o CVA  -h/o AVR on coumadin  -CAD with h/o MI  -depression  -anemia of chronic disease     Physical Exam   Blood pressure 140/69, pulse 102, temperature 98.7 °F (37.1 °C), temperature source Oral HCl 10 MG Tabs  Commonly known as:  NAMENDA      Take 5 mg by mouth daily. Refills:  0     Metoprolol Succinate ER 50 MG Tb24  Commonly known as: Toprol XL      Take 1 tablet (50 mg total) by mouth daily.    Quantity:  90 tablet  Refills:  0     Multiple

## 2018-07-25 ENCOUNTER — TELEPHONE (OUTPATIENT)
Dept: INTERNAL MEDICINE CLINIC | Facility: CLINIC | Age: 68
End: 2018-07-25

## 2018-07-31 ENCOUNTER — OFFICE VISIT (OUTPATIENT)
Dept: INTERNAL MEDICINE CLINIC | Facility: CLINIC | Age: 68
End: 2018-07-31
Payer: MEDICARE

## 2018-07-31 VITALS
BODY MASS INDEX: 16.07 KG/M2 | RESPIRATION RATE: 18 BRPM | SYSTOLIC BLOOD PRESSURE: 120 MMHG | DIASTOLIC BLOOD PRESSURE: 80 MMHG | HEIGHT: 66 IN | OXYGEN SATURATION: 98 % | HEART RATE: 78 BPM | WEIGHT: 100 LBS | TEMPERATURE: 97 F

## 2018-07-31 DIAGNOSIS — L89.150 DECUBITUS ULCER OF SACRAL REGION, UNSTAGEABLE (HCC): ICD-10-CM

## 2018-07-31 PROCEDURE — 99495 TRANSJ CARE MGMT MOD F2F 14D: CPT | Performed by: INTERNAL MEDICINE

## 2018-07-31 PROCEDURE — 1111F DSCHRG MED/CURRENT MED MERGE: CPT | Performed by: INTERNAL MEDICINE

## 2018-07-31 RX ORDER — ALBUTEROL SULFATE 2.5 MG/3ML
SOLUTION RESPIRATORY (INHALATION) EVERY 6 HOURS PRN
Qty: 50 VIAL | Refills: 0 | Status: CANCELLED | OUTPATIENT
Start: 2018-07-31 | End: 2018-08-14

## 2018-07-31 RX ORDER — FERROUS SULFATE 325(65) MG
TABLET ORAL
Qty: 180 TABLET | Refills: 1 | Status: SHIPPED | OUTPATIENT
Start: 2018-07-31

## 2018-07-31 RX ORDER — ATORVASTATIN CALCIUM 10 MG/1
10 TABLET, FILM COATED ORAL DAILY
Qty: 90 TABLET | Refills: 3 | Status: SHIPPED | OUTPATIENT
Start: 2018-07-31

## 2018-07-31 RX ORDER — HYDRALAZINE HYDROCHLORIDE 50 MG/1
50 TABLET, FILM COATED ORAL 3 TIMES DAILY
Qty: 360 TABLET | Refills: 0 | Status: SHIPPED | OUTPATIENT
Start: 2018-07-31

## 2018-07-31 RX ORDER — ALBUTEROL SULFATE 90 UG/1
2 AEROSOL, METERED RESPIRATORY (INHALATION) EVERY 6 HOURS PRN
COMMUNITY
End: 2018-07-31

## 2018-07-31 RX ORDER — MIRTAZAPINE 15 MG/1
15 TABLET, FILM COATED ORAL DAILY
Qty: 90 TABLET | Refills: 2 | Status: SHIPPED | OUTPATIENT
Start: 2018-07-31

## 2018-07-31 RX ORDER — TRAMADOL HYDROCHLORIDE 50 MG/1
50 TABLET ORAL EVERY 6 HOURS PRN
Qty: 45 TABLET | Refills: 1 | Status: SHIPPED | OUTPATIENT
Start: 2018-07-31

## 2018-07-31 RX ORDER — MEMANTINE HYDROCHLORIDE 10 MG/1
5 TABLET ORAL DAILY
Qty: 90 TABLET | Refills: 2 | Status: SHIPPED | OUTPATIENT
Start: 2018-07-31

## 2018-07-31 RX ORDER — METOPROLOL SUCCINATE 50 MG/1
50 TABLET, EXTENDED RELEASE ORAL DAILY
Qty: 90 TABLET | Refills: 0 | Status: SHIPPED | OUTPATIENT
Start: 2018-07-31

## 2018-07-31 RX ORDER — ALBUTEROL SULFATE 90 UG/1
2 AEROSOL, METERED RESPIRATORY (INHALATION) EVERY 6 HOURS PRN
Qty: 1 INHALER | Refills: 12 | Status: SHIPPED | OUTPATIENT
Start: 2018-07-31

## 2018-07-31 RX ORDER — AMLODIPINE BESYLATE 10 MG/1
10 TABLET ORAL DAILY
Qty: 90 TABLET | Refills: 3 | Status: SHIPPED | OUTPATIENT
Start: 2018-07-31

## 2018-07-31 NOTE — PROGRESS NOTES
HPI:    Jessica Morton is a 77year old female here today for hospital follow up.    She was discharged from Inpatient hospital, [facility]  to Home   Admission Date: 7/17/18   Discharge Date: 7/22/18    Hospital Discharge Diagnoses (since 7/1 increased lethargy. Worse appetite than usual.    Patient has UTI with sepsis . Urine culture  + providencia stuartii. Started initially on Zosyn, then switched to Ceftriaxone. Blood culture was.  She had mental status change due to sepsis, improved with does not drink alcohol or use drugs. ROS:   GENERAL: wheelchair bound, poor appetite. SKIN:multiple pressure ulcer.  R hip stage 3 pressure ulcer, R ankle stage 3 pressure ulcer  EYES: denies blurred vision   HEENT: denies nasal congestion, sinus pain 32.1   RDW      11.0 - 15.0 % 17.3 (H)   Platelet Count      840 - 400 K/   MEAN PLATELET VOLUME      7.4 - 10.3 fL 8.4   Neutrophils %      % 60   Lymphocytes %      % 23   Monocytes %      % 13   Eosinophils %      % 3   Basophils %      % 1   Neut  Severe malnutrition  -optimize nutritional support. -  ensure high protein supplements ,BID. Samples and coupon given.      Iron deficiency anemia  -continue Iron supplement    CKD  Stage 3  -adequate hydration, avoid NSAID, nephrotoxins    H/o CVA  - Mortality: moderate    Overall Risk:   moderate    Patient seen within 14 days from date of discharge.      Jackeline Goldman MD, 7/31/2018

## 2018-08-08 ENCOUNTER — TELEPHONE (OUTPATIENT)
Dept: INTERNAL MEDICINE CLINIC | Facility: CLINIC | Age: 68
End: 2018-08-08

## 2018-09-21 ENCOUNTER — TELEPHONE (OUTPATIENT)
Dept: INTERNAL MEDICINE CLINIC | Facility: CLINIC | Age: 68
End: 2018-09-21

## 2018-09-21 DIAGNOSIS — Z79.01 CURRENT USE OF ANTICOAGULANT THERAPY: Primary | ICD-10-CM

## 2018-09-21 NOTE — TELEPHONE ENCOUNTER
Spoke with pt's daughter states pt has been taking warfarin 5Mg every other day. Needs a ne prescription for warfarin. Also last time a nurse checked her inr level has been over 1 month, has the monitor at home.  Just needs strips to test  Can you please s

## 2018-09-21 NOTE — TELEPHONE ENCOUNTER
Patient's EC called and left  requesting refill on Warfarin and \"about the doctor that would come to the house. \"    Patient was on warfarin as an inpatient back in July; per Dr. Judah Bloch last OV note, to repeat INR in a month (by August) -- latest INR result

## 2018-09-22 RX ORDER — WARFARIN SODIUM 5 MG/1
5 TABLET ORAL EVERY OTHER DAY
Qty: 3 TABLET | Refills: 0 | Status: SHIPPED | OUTPATIENT
Start: 2018-09-22

## 2018-09-22 NOTE — TELEPHONE ENCOUNTER
Called all 3  numbers given for patient, messages left. Called patient's number and Darbya's number twice. Last note by Dr. Sirena Lozoya states patient should be taking 3 mg warfarin, request is for 5 mg warfarin every other day.   3 tablets of the 5 mg left at

## 2018-09-22 NOTE — TELEPHONE ENCOUNTER
Dr Darell Jordan asked me to try to get a hold of patient as well  Called patient to inform them that she needs to get the strips from the company she got the machine from or just come in to have us draw it.   No answer left a message to call back

## 2018-09-27 ENCOUNTER — TELEPHONE (OUTPATIENT)
Dept: INTERNAL MEDICINE CLINIC | Facility: CLINIC | Age: 68
End: 2018-09-27

## 2018-09-27 NOTE — TELEPHONE ENCOUNTER
Home Health called regarding Ms. Nonda Portal last appointment on 7/31/18, they want the progress notes regarding any changes, medications and for her Coumadin medication as well. Please fax it to [phone].

## 2018-09-27 NOTE — TELEPHONE ENCOUNTER
Talked to patient;s daughter, Merced Sandoval. Since patient is home bound and unable to come to our clinic for follow up. It would be more beneficial to have home physican follow her on  Regular basis to avoid frequent  Hospitalization.      She understands and is

## 2018-10-16 ENCOUNTER — TELEPHONE (OUTPATIENT)
Dept: INTERNAL MEDICINE CLINIC | Facility: CLINIC | Age: 68
End: 2018-10-16

## 2019-02-06 ENCOUNTER — TELEPHONE (OUTPATIENT)
Dept: INTERNAL MEDICINE CLINIC | Facility: CLINIC | Age: 69
End: 2019-02-06

## 2019-02-06 NOTE — TELEPHONE ENCOUNTER
Maycol Gambino called to let the Dr know that she is still waiting on forms to be signed for the pt.

## 2020-02-11 NOTE — ANESTHESIA POSTPROCEDURE EVALUATION
Patient: Lee Yin    Procedure Summary     Date:  10/16/17 Room / Location:  [address] [place] [address] MAIN OR 05 / [facility] MAIN OR    Anesthesia Start:  [address] Anesthesia Stop:  0706    Procedure:   I AND D ISCHIAL OR SACRAL DEBRIDEMENT (N/A ) Diagnosis:       Decubitus ul Anesthesia Type: 2% lidocaine with epinephrine

## 2024-03-11 NOTE — TELEPHONE ENCOUNTER
daughter notified medication at the pharmacy
medication needed by the wound nurse to change bandages tomorrow, order placed per Dr Tatiana Tahkur
No

## 2025-06-12 NOTE — PROGRESS NOTES
Gold Service - Internal Medicine Daily Note   Date of Service: 6/12/2025  Patient: Dominique Harkins  MRN: [id number]  Admission Date: 6/11/2025  Hospital Day # 1    Assessment & Plan    Patient seen and examined  Hx of psychosis, elated mood   Psych re consulted   Hx of recent covid 19 infection and patient tested pos on 5/31  No respiratory symptoms at this  moment  CXR done reviewed, questionable pneumonia, right pleural effusion, trace  Discontinue IV ceftriaxone , start azithromycin mono therapy for possible atypical pneumonia         Micaela Murray MD  Internal Medicine Staff Hospitalist   [facility]   Pager: [phone]    Team: Selma Chandra 16  Page Cross Cover after 5 pm: pager [phone]   ___________________________________________________________________    Subjective & Interval Hx:    Patient does not have any respiratory complaints  No cough or sob  No fever  Greater than 10 days since testing positive for covid19  Discontinue covid isolation   Psych re consult      Last 24 hr care team notes reviewed.   ROS:  4 point ROS including Respiratory, CV, GI and , other than that noted in the HPI, is negative.    Medications: Reviewed in EPIC. List below for reference    Current Facility-Administered Medications:     acetaminophen (TYLENOL) tablet 650 mg, 650 mg, Oral, Q4H PRN **OR** acetaminophen (TYLENOL) Suppository 650 mg, 650 mg, Rectal, Q4H PRN, Ashley Santos, NP    albuterol (PROVENTIL) neb solution 2.5 mg, 2.5 mg, Nebulization, Q4H PRN, Ashley Santos NP, 2.5 mg at 06/12/25 0547    apixaban ANTICOAGULANT (ELIQUIS) tablet 2.5 mg, 2.5 mg, Oral, BID, Ashley Santos NP, 2.5 mg at 06/12/25 0802    atenolol (TENORMIN) tablet 25 mg, 25 mg, Oral, BID, Ashley Santos NP, 25 mg at 06/12/25 1028    calcium carbonate (TUMS) chewable tablet 1,000 mg, 1,000 mg, Oral, 4x Daily PRN, Ashley Santos, NP    clomiPRAMINE (ANAFRANIL) capsule 50 mg, 50 mg, Oral, At  [facility] Note:  Renal Adjustment for Cefepime HCl (MAXIPIME)     Love Zuleta is a 69year old female who has been prescribed Cefepime HCl (MAXIPIME) 1 g in sodium chloride 0.9 % 100 mL IVPB every 8 hrs.   CrCl is estimated creatinine clearance i Bedtime, Rachael Rowland PA-C, 50 mg at 06/11/25 2131    glucose gel 15-30 g, 15-30 g, Oral, Q15 Min PRN **OR** dextrose 50 % injection 25-50 mL, 25-50 mL, Intravenous, Q15 Min PRN **OR** glucagon injection 1 mg, 1 mg, Subcutaneous, Q15 Min PRN, Ashley Santos NP    diclofenac (VOLTAREN) 1 % topical gel 4 g, 4 g, Topical, 4x Daily, Ashley Santos NP, 4 g at 06/12/25 0803    fluticasone (FLONASE) 50 MCG/ACT spray 1 spray, 1 spray, Both Nostrils, Daily PRN, Ashley Santos NP    furosemide (LASIX) tablet 20 mg, 20 mg, Oral, BID, Ashley Santos NP, 20 mg at 06/12/25 0802    insulin aspart (NovoLOG) injection (RAPID ACTING), 1-7 Units, Subcutaneous, TID AC, Ashley Santos NP, 2 Units at 06/12/25 1241    insulin aspart (NovoLOG) injection (RAPID ACTING), 1-5 Units, Subcutaneous, At Bedtime, Ashley Santos NP, 3 Units at 06/11/25 2142    insulin glargine (LANTUS PEN) injection 13 Units, 13 Units, Subcutaneous, QPM, David Leon MD, 13 Units at 06/11/25 2142    ketoconazole (NIZORAL) 2 % cream, , Topical, BID PRN, Ashley Santos NP    lamoTRIgine (LaMICtal) tablet 200 mg, 200 mg, Oral, BID, Rachael Rowland PA-C, 200 mg at 06/12/25 1028    lidocaine (LMX4) cream, , Topical, Q1H PRN, Ashley Santos NP    lidocaine 1 % 0.1-1 mL, 0.1-1 mL, Other, Q1H PRN, Ashley Santos NP    melatonin tablet 1 mg, 1 mg, Oral, At Bedtime PRN, Ashley Santos NP    montelukast (SINGULAIR) tablet 10 mg, 10 mg, Oral, Daily, Ashley Santos NP, 10 mg at 06/12/25 0802    olopatadine (PATANOL) 0.1 % ophthalmic solution 1 drop, 1 drop, Both Eyes, BID, Ashley Santos NP, 1 drop at 06/12/25 0816    ondansetron (ZOFRAN ODT) ODT tab 4 mg, 4 mg, Oral, Q6H PRN **OR** ondansetron (ZOFRAN) injection 4 mg, 4 mg, Intravenous, Q6H PRN, Ashley Santos NP    pantoprazole (PROTONIX) EC tablet 40 mg, 40 mg, Oral, BID AC, David Leon MD, 40 mg at 06/12/25 0802    Patient is already  receiving anticoagulation with heparin, enoxaparin (LOVENOX), warfarin (COUMADIN)  or other anticoagulant medication, , Does not apply, Continuous PRN, Ashley Santos NP    pimecrolimus (ELIDEL) 1 % cream, , Topical, BID, Ashley Santos NP, Given at 06/12/25 0803    polyethylene glycol (MIRALAX) Packet 17 g, 17 g, Oral, Daily, Ashley Santos NP, 17 g at 06/12/25 0803    psyllium (METAMUCIL/KONSYL) capsule 1 capsule, 1 capsule, Oral, Daily, Ashley Santos NP, 1 capsule at 06/12/25 0802    pyridOXINE (VITAMIN B6) tablet 100 mg, 100 mg, Oral, Daily, Ashley Santos NP    QUEtiapine (SEROquel) tablet 25 mg, 25 mg, Oral, BID, Rachael Rowland PA-C, 25 mg at 06/12/25 0626    QUEtiapine (SEROquel) tablet 50 mg, 50 mg, Oral, At Bedtime, Rachael Rowland PA-C, 50 mg at 06/11/25 2131    risperiDONE (risperDAL) tablet 0.5 mg, 0.5 mg, Oral, Daily, Rachael Rowland PA-C, 0.5 mg at 06/12/25 0803    risperiDONE (risperDAL) tablet 0.5 mg, 0.5 mg, Oral, BID PRN, Rachael Rowland PA-C, 0.5 mg at 06/12/25 0610    risperiDONE (risperDAL) tablet 1 mg, 1 mg, Oral, At Bedtime, Rachael Rowland PA-C, 1 mg at 06/11/25 2131    rosuvastatin (CRESTOR) tablet 20 mg, 20 mg, Oral, QPM, Ashley Santos NP, 20 mg at 06/11/25 2130    senna-docusate (SENOKOT-S/PERICOLACE) 8.6-50 MG per tablet 1 tablet, 1 tablet, Oral, BID PRN **OR** senna-docusate (SENOKOT-S/PERICOLACE) 8.6-50 MG per tablet 2 tablet, 2 tablet, Oral, BID PRN, Ashley Santos NP    sodium chloride (PF) 0.9% PF flush 3 mL, 3 mL, Intracatheter, Q8H Jerald GUO Ford Christian, MD    sodium chloride (PF) 0.9% PF flush 3 mL, 3 mL, Intracatheter, q1 min prn, El Marques MD    sodium chloride (PF) 0.9% PF flush 3 mL, 3 mL, Intracatheter, Q8H BRANT, Ashley Santos NP    sodium chloride (PF) 0.9% PF flush 3 mL, 3 mL, Intracatheter, q1 min prn, Aslhey Santos NP    Vitamin D3 (CHOLECALCIFEROL) tablet 25 mcg, 25 mcg, Oral, Daily, Tom  KRYSTAL Ramirez, 25 mcg at 06/12/25 0802    Physical Exam:    /66 (BP Location: Right arm)   Pulse 83   Temp 98  F (36.7  C) (Oral)   Resp 18   SpO2 95%      GENERAL: Alert and oriented x 3. NAD.   HEENT: Anicteric sclera. Mucous membranes moist.   CV: RRR. S1, S2. No murmurs appreciated.   RESPIRATORY: Effort normal on RA. Lungs CTAB with no wheezing, rales, rhonchi.   GI: Abdomen soft and non distended with normoactive bowel sounds present in all quadrants. No tenderness, rebound, guarding.   NEUROLOGICAL: No focal deficits. Moves all extremities.    EXTREMITIES: No peripheral edema. Intact bilateral pedal pulses.   SKIN: No jaundice. No rashes.     Labs & Studies of Note: I personally reviewed the following studies:  CBC RESULTS:   Recent Labs   Lab Test 06/12/25 0627   WBC 6.4   RBC 4.00   HGB 11.9   HCT 36.4   MCV 91   MCH 29.8   MCHC 32.7   RDW 12.8        Last Comprehensive Metabolic Panel:  Lab Results   Component Value Date     06/12/2025    POTASSIUM 3.9 06/12/2025    CHLORIDE 100 06/12/2025    CO2 23 06/12/2025    ANIONGAP 15 06/12/2025     (H) 06/12/2025    BUN 16.8 06/12/2025    CR 0.90 06/12/2025    GFRESTIMATED 65 06/12/2025    KHUSHI 9.1 06/12/2025       XR Chest Port 1 View   Final Result   IMPRESSION: Possible infection versus edema/trace right pleural   effusion at right lung base. Pacemaker. Bilateral reverse total   shoulder arthroplasty.      REBEL MUIR MD            SYSTEM ID:  [id number]

## (undated) DEVICE — DRAPE SHEET LG

## (undated) DEVICE — TOWEL OR BLU 16X26 STRL

## (undated) DEVICE — YANKAUER SUCTION INSTRUMENT NO CONTROL VENT, BULB TIP, CLEAR: Brand: YANKAUER

## (undated) DEVICE — SPONGE LAP 18X18 XRAY STRL

## (undated) DEVICE — GAUZE SPONGES,12 PLY: Brand: CURITY

## (undated) DEVICE — DRAPE SRG 26X15IN UTL TPE STRL

## (undated) DEVICE — TRAY SRGPRP PVP IOD WT SCRB SM

## (undated) DEVICE — BLADE 11 SHRP BP SS SRG STRL

## (undated) DEVICE — INTENDED FOR TISSUE SEPARATION, AND OTHER PROCEDURES THAT REQUIRE A SHARP SURGICAL BLADE TO PUNCTURE OR CUT.: Brand: BARD-PARKER ® STAINLESS STEEL BLADES

## (undated) DEVICE — SOL  .9 1000ML BTL

## (undated) DEVICE — GOWN SURG AERO BLUE PERF XLG

## (undated) DEVICE — OCCLUSIVE GAUZE ROLL,3% BISMUTH TRIBROMOPHENATE IN PETROLATUM BLEND: Brand: XEROFORM

## (undated) DEVICE — CULTURE COLLECT/TRANSPORT SYS

## (undated) DEVICE — ABDOMINAL PAD: Brand: CURITY

## (undated) DEVICE — MINOR GENERAL: Brand: MEDLINE INDUSTRIES, INC.

## (undated) DEVICE — CULTURE TUBE ANAEROBIC

## (undated) DEVICE — STERILE LATEX POWDER-FREE SURGICAL GLOVESWITH NITRILE COATING: Brand: PROTEXIS

## (undated) NOTE — IP AVS SNAPSHOT
[facility]            (For Outpatient Use Only) Initial Admit Date: 10/10/2017   Inpt/Obs Admit Date: Inpt: 10/10/17 / Obs: N/A   Discharge Date:    Tevin Early:  [de-identified]   MRN: [de-identified]   CSN: [id number]        [facility] Account Financial Class: Medicare Advantage    October 18, 2017

## (undated) NOTE — LETTER
Fox Turner  7/8/1950    A patient of your clinic was recently seen by the hospitalists at [facility], and [facility] RN will be following up with you on July 24  For INR Monitoring.      The patient's last INR values were, as follows:

## (undated) NOTE — LETTER
[address], [place]  Authorization for Invasive Procedures  1.  I hereby authorize Dr. Latasha Chau , my physician and whomever may be designated as the doctor's assistant, to perform the following operation and/or procedure:  *** on Jacqu performed for the purposes of advancing medicine, science, and/or education, provided my identity is not revealed. If the procedure has been videotaped, the physician/surgeon will obtain the original videotape.  The hospital will not be responsible for stor My signature below affirms that prior to the time of the procedure, I have explained to the patient and/or her legal representative, the risks and benefits involved in the proposed treatment and any reasonable alternative to the proposed treatment.  I have

## (undated) NOTE — LETTER
Date: 7/31/2018    Patient Name: Maylin Neighbours     Please excuse Patient's daughter Gunjan Pablo from work she had a Dr appointment            To Whom it may concern:     This letter has been written at the patient's request. The above patient was seen a

## (undated) NOTE — LETTER
Hospital Discharge Documentation  Please phone to schedule a hospital follow up appointment.     From: [address] Hospitalist's Office  Phone: [phone]    Patient discharged time/date: 7/22/2018  4:30 PM  Patient discharge disposition:  [address] with early sepsis  -ucx - +providencia stuartii  -change zosyn to ceftriaxone.  Finished course of abx.  -bcx - NGTD     Acute metabolic encephalopathy 2/2 above  Dementia  -lethargy improved, she is now alert  -monitor mental status     Chronic afib  -con' Take 1 tablet (500 mg total) by mouth daily. Quantity:  30 tablet  Refills:  0     atorvastatin 10 MG Tabs  Commonly known as:  LIPITOR      Take 10 mg by mouth daily.    Refills:  0     Cyclobenzaprine HCl 5 MG Tabs  Commonly known as:  FLEXERIL      Ta TCM Follow-Up Recommendation:  LACE > 58: High Risk of readmission after discharge from the hospital.        I spent >30 minutes on this discharge, counseling patient and discussing discharge plans. All questions answered.       Alvino Stoddard MD[JH.1]      [employer]

## (undated) NOTE — MR AVS SNAPSHOT
[facility] at [address] [place] [address] Road [address] [phone]               Thank you for choosing us for your health care visit with Vira Portillo MD.  We are glad to serve you and happy to provide Assoc Dx:  Chronic atrial fibrillation ([facility] [I48.2], Therapeutic drug monitoring [Z51.81]                 Follow-up Instructions     Return in about 3 weeks (around 5/19/2017).       Scheduling Instructions     Friday April 28, 2017     Imaging:  PEPITO ARTEAGA Note to Managed Care Patients: This is the physician order form only and not an authorization for services.     The [facility] refers patients to have physical therapy done at [facility] however, your insurance company may require you Tests on this list are recommended by your physician but may not be covered, or covered at this frequency, by your insurer. Please check with your insurance carrier before scheduling to verify coverage.    PREVENTATIVE SERVICES  INDICATIONS AND SCHEDULE Int this or any previous visit. No flowsheet data found. Fecal Occult Blood   Covered Annually No results found for: FOB, OCCULTSTOOL No flowsheet data found.      Barium Enema-   uncomfortable but covered  Covered but uncomfortable   Glaucoma Screening No orders found for this or any previous visit.  Medium/high risk factors:   End-stage renal disease   Hemophiliacs who received Factor VIII or IX concentrates   Clients of institutions for the mentally retarded   Persons who live in the same house as a He Take 2.5 mg by mouth daily. Commonly known as:  PRINIVIL,ZESTRIL           simvastatin 10 MG Tabs   Take 10 mg by mouth nightly. Commonly known as:  ZOCOR           Vitamin D 1000 units Tabs   Take by mouth.            Warfarin Sodium 10 MG Tabs   Take Moderation of alcohol consumption Men: limit to <= 2 drinks* per day. Women and lighter weight persons: limit to <= 1 drink* per day. TOP FALL PREVENTION TIPS    INSIDE YOUR HOME   KITCHEN:  ? Use non skid mats only. ?  Clean up spills as soon a Avoid over sized portions. Don’t eat while when you’re bored.      EAT THESE FOODS MORE OFTEN: EAT THESE FOODS LESS OFTEN:   Make half your plate fruits and vegetables Highly refined, white starches including white bread, rice and pasta   Eat plenty of pr

## (undated) NOTE — IP AVS SNAPSHOT
Patient Demographics     Address  [address] [place] [address] Phone  [phone] [facility]  [phone] [facility]      Emergency Contact(s)     Name Relation Home Work Mobile    Mallorie Daughter [phone]      [facility] Relative 75 Commonly known as:  APRESOLINE  Next dose due:  10/18/17 evening      Take 50 mg by mouth 3 (three) times daily.           ipratropium-albuterol 0.5-2.5 (3) MG/3ML Soln  Commonly known as:  DUONEB  Next dose due:  Anytime as needed      Take 3 mL by [employer] Take 20 mg by mouth nightly.          sodium chloride 0.9 % SOLN 100 mL with Cefepime HCl 1 g SOLR 1 g  Next dose due:  10/18/17 evening      Inject 1 g into the vein Q12H Peds.   Stop taking on:  11/1/2017   Jesus Roca MD         TraMADol HCl 50 MG Tab Vancomycin HCl 500  mg in sodium chloride 0.9 % 250 mL           [id number] - MAR ACTION REPORT  (last 24 hrs)    ** SITE UNKNOWN **     Order ID Medication Name Action Time Action Reason Comments    [id number] AmLODIPine Besylate (NORVASC) tab 10 mg 1 [id number] metRONIDAZOLE in NaCl (FLAGYL) 5 mg/ml IVPB premix 500 mg 10/18/17 1405 New Bag      [id number] morphINE sulfate (PF) 2 MG/ML injection 2 mg 10/17/17 2225 Given      [id number] multivitamin (ADULT) tab 1 tablet 10/18/17 0913 Given      [id number] E The accuracy of the MDRD equation is not suitable for acute renal failure patients and it is not recommended for use with pregnant women.             PROTHROMBIN TIME (PT) [[id number]] (Abnormal)  Resulted: 10/18/17 0717, Result status: Final result   Orderi Ordering provider:  Idalia Bennett MD  10/16/17 2300 Resulting lab:  [facility]    Specimen Information    Type Source Collected On   Blood — 10/17/17 1925          Components    Component Value Reference Range Flag Lab   Vancomycin Trough 20.2 10.0 - 20. Aerobic Bacterial Culture Once [[id number]]  (Abnormal)  (Susceptibility) Collected:  10/10/17 160    Order Status:  Completed Lab Status:  Final result Updated:  10/13/17 2023    Specimen:  Other from Hip, left      Aerobic Culture Result 2+ growth Pseud home.  The patient has chronic atrial fibrillation, DVT, aortic valve replacement, old CVA, dementia, anemia, hypertension, pressure ulcers. She was admitted to [facility] for sepsis  In 08/16 through 09/06.   She was later discharged to [facility] on 10 appetite, frequent confusion. She denies chest pain, shortness of breath. No fever. She has constipation. No nausea and vomiting. Her appetite has been poor. She complained of chronic pain of her sacral ulcers.       PHYSICAL EXAMINATION:    GENERAL: 7.   The patient is MRSA negative. 8.   Impacted stool : Bowel protocol. 9.   FULL CODE. Dictated By Maxim Diaz MD  d: 10/10/2017 19:54:28  t: 10/10/2017 20:37:21  Job [id number]  [facility]/[YC.1]        Electronically signed by Ava Pemberton Allergies:  No Known Allergies    Medications:    Current Facility-Administered Medications:   •  silver sulfADIAZINE (SILVADENE) 1 % cream, , Topical, BID  •  Warfarin Sodium (COUMADIN) tab 6 mg, 6 mg, Oral, Nightly  •  Vitamin C (VITAMIN C) tab 500 mg, 5 General: Alert, not oriented to place or events,  makes expression of dislike mostly   HEENT: Exam is unremarkable. Neck:. No JVD. Lungs: Clear to auscultation bilaterally. Cardiac: Regular rate and rhythm. No murmur.   Abdomen:  Soft, non-distended, Electronically signed by Patrizia Santos MD on 10/12/2017  6:38 PM   Attribution [facility]. 1 - Patrizia Santos MD on 10/12/2017  6:31 PM                     D/C Summary     No notes of this type exist for this encounter.       Physical Therapy Notes (last 72